# Patient Record
Sex: FEMALE | Race: WHITE | NOT HISPANIC OR LATINO | Employment: FULL TIME | URBAN - METROPOLITAN AREA
[De-identification: names, ages, dates, MRNs, and addresses within clinical notes are randomized per-mention and may not be internally consistent; named-entity substitution may affect disease eponyms.]

---

## 2017-01-17 RX ORDER — LEVOTHYROXINE SODIUM 100 UG/1
TABLET ORAL
Qty: 90 TABLET | Refills: 0 | Status: SHIPPED | OUTPATIENT
Start: 2017-01-17 | End: 2017-04-22 | Stop reason: SDUPTHER

## 2017-01-31 ENCOUNTER — PATIENT MESSAGE (OUTPATIENT)
Dept: INTERNAL MEDICINE | Facility: CLINIC | Age: 37
End: 2017-01-31

## 2017-02-01 ENCOUNTER — OFFICE VISIT (OUTPATIENT)
Dept: INTERNAL MEDICINE | Facility: CLINIC | Age: 37
End: 2017-02-01
Payer: COMMERCIAL

## 2017-02-01 VITALS
SYSTOLIC BLOOD PRESSURE: 114 MMHG | HEART RATE: 70 BPM | HEIGHT: 66 IN | WEIGHT: 176.81 LBS | DIASTOLIC BLOOD PRESSURE: 70 MMHG | TEMPERATURE: 98 F | BODY MASS INDEX: 28.42 KG/M2

## 2017-02-01 DIAGNOSIS — R30.0 DYSURIA: Primary | ICD-10-CM

## 2017-02-01 LAB
BILIRUB SERPL-MCNC: ABNORMAL MG/DL
BILIRUB UR QL STRIP: NEGATIVE
BLOOD URINE, POC: ABNORMAL
CLARITY UR REFRACT.AUTO: CLEAR
COLOR UR AUTO: YELLOW
COLOR, POC UA: YELLOW
GLUCOSE UR QL STRIP: NEGATIVE
GLUCOSE UR QL STRIP: NORMAL
HGB UR QL STRIP: NEGATIVE
KETONES UR QL STRIP: ABNORMAL
KETONES UR QL STRIP: NEGATIVE
LEUKOCYTE ESTERASE UR QL STRIP: NEGATIVE
LEUKOCYTE ESTERASE URINE, POC: ABNORMAL
NITRITE UR QL STRIP: NEGATIVE
NITRITE, POC UA: ABNORMAL
PH UR STRIP: 7 [PH] (ref 5–8)
PH, POC UA: 7
PROT UR QL STRIP: NEGATIVE
PROTEIN, POC: ABNORMAL
SP GR UR STRIP: 1.01 (ref 1–1.03)
SPECIFIC GRAVITY, POC UA: 1015
URN SPEC COLLECT METH UR: NORMAL
UROBILINOGEN UR STRIP-ACNC: NEGATIVE EU/DL
UROBILINOGEN, POC UA: NORMAL

## 2017-02-01 PROCEDURE — 99213 OFFICE O/P EST LOW 20 MIN: CPT | Mod: 25,S$GLB,, | Performed by: PHYSICIAN ASSISTANT

## 2017-02-01 PROCEDURE — 81001 URINALYSIS AUTO W/SCOPE: CPT | Mod: S$GLB,,, | Performed by: PHYSICIAN ASSISTANT

## 2017-02-01 PROCEDURE — 99999 PR PBB SHADOW E&M-EST. PATIENT-LVL V: CPT | Mod: PBBFAC,,, | Performed by: PHYSICIAN ASSISTANT

## 2017-02-01 PROCEDURE — 81003 URINALYSIS AUTO W/O SCOPE: CPT

## 2017-02-01 PROCEDURE — 87086 URINE CULTURE/COLONY COUNT: CPT

## 2017-02-01 PROCEDURE — 87591 N.GONORRHOEAE DNA AMP PROB: CPT

## 2017-02-01 NOTE — PROGRESS NOTES
Subjective:       Patient ID: Bibi Lorenzo is a 36 y.o. female.        Chief Complaint: Urinary Frequency (x1week) and Abdominal Pain (lower, pain=3 x 1week)    HPI Comments: Bibi Lorenzo is an established patient of Morenita Beck MD here today for urgent care visit.    Dysuria, suprapubic pressure x 1 week.  No low back pain.  Increased urinary frequency.  No hematuria.  Urine has been cloudy.  LMP 1/15/17.  No N/V/D/C.  No fever.  Sx started about 1 week ago.  No vaginal discharge.             Review of Systems   Constitutional: Negative for chills, diaphoresis, fatigue and fever.   HENT: Negative for congestion and sore throat.    Eyes: Negative for visual disturbance.   Respiratory: Negative for cough, chest tightness and shortness of breath.    Cardiovascular: Negative for chest pain, palpitations and leg swelling.   Gastrointestinal: Positive for abdominal pain (suprapubic). Negative for blood in stool, constipation, diarrhea, nausea and vomiting.   Genitourinary: Positive for dysuria, frequency and urgency. Negative for hematuria.   Musculoskeletal: Negative for arthralgias and back pain.   Skin: Negative for rash.   Neurological: Negative for dizziness, syncope, weakness and headaches.   Psychiatric/Behavioral: Negative for dysphoric mood and sleep disturbance. The patient is not nervous/anxious.        Objective:      Physical Exam   Constitutional: She appears well-developed and well-nourished.   HENT:   Head: Normocephalic.   Right Ear: External ear normal.   Left Ear: External ear normal.   Mouth/Throat: Oropharynx is clear and moist.   Eyes: Pupils are equal, round, and reactive to light.   Cardiovascular: Normal rate, regular rhythm and normal heart sounds.  Exam reveals no gallop and no friction rub.    No murmur heard.  Pulmonary/Chest: Effort normal and breath sounds normal. No respiratory distress.   Abdominal: Soft. Normal appearance. There is tenderness (mild) in the suprapubic area.  "There is no rigidity, no rebound, no guarding and no CVA tenderness.   Musculoskeletal: She exhibits no edema.   Neurological: She is alert.   Skin: Skin is warm and dry.   Psychiatric: She has a normal mood and affect.   Nursing note and vitals reviewed.      Assessment:       1. Dysuria        Plan:       Bibi was seen today for urinary frequency and abdominal pain.    Diagnoses and all orders for this visit:    Dysuria  -     POCT urinalysis, dipstick or tablet reag  -     Urinalysis  -     Urine culture  -     C. trachomatis/N. gonorrhoeae by AMP DNA Urine  -     Ambulatory consult to Urology    Urine dip negative for WBC's and blood.  Will send to lab for full urinalysis and cx.  Will check for GC/chlamydia though she really has no concern for this as she is monogamous with her , just wants screening to be thorough.  Will consult urology.  She reports every couple of months she seems to get urinary sx and they will often go away with drinking fluids/cranberry juice.    Pt has been given instructions populated from [employer] database and has verbalized understanding of the after visit summary and information contained wherein.    Follow up with a primary care provider. May go to ER for acute shortness of breath, lightheadedness, fever, or any other emergent complaints or changes in condition.    "This note will be shared with the patient"    Future Appointments  Date Time Provider Department Center   2/1/2017 2:30 PM Yandy Bernal PA-C [facility]   2/2/2017 11:15 AM Jong Horn MD [facility]   2/21/2017 2:40 PM Morenita Beck MD [facility]   2/27/2017 10:00 AM Tim Santizo Jr., MD [facility]OLOGY [facility]               "

## 2017-02-01 NOTE — PATIENT INSTRUCTIONS
Dysuria with Uncertain Cause (Adult)    The urethra is the tube that allows urine to pass out of the body. In a woman, the urethra is the opening above the vagina. In men, the urethra is the opening on the tip of the penis. Dysuria is the feeling of pain or burning in the urethra when passing urine.  Dysuria can be caused by anything that irritates or inflames the urethra. This can be caused by an infection or chemical irritation. The most common cause of dysuria in adults is a bladder infection. This is diagnosed with a urine test. It requires treatment with an antibiotic.  Soaps, lotions, colognes, feminine hygiene products, and birth control jellies, creams, and foams can cause chemical irritation and dysuria. It will go away in 1 to 3 days after the last time you use these irritants.  Sexually transmitted disease (STD) from chlamydia or gonorrhea can cause dysuria. If your doctor suspects this, he or she may take a culture specimen. It will take about 3 days to get the results. Antibiotic treatment may be started before the culture test returns.  In women who have gone through menopause, dysuria can be a result of dryness in the lining of the urethra. This can be treated with hormones. Dysuria becomes long-term (chronic) when it lasts for weeks or months. You may need to see a specialist (urologist) to diagnose and treat chronic dysuria.  Home care  The following home care measures may help:  · Avoid any chemicals or products that you suspect may be causing your symptoms.  · If you were given a prescription medicine, take as directed and take the entire amount.  · If a culture was taken, do not have sex until you have been told that it is negative (no infection). Then follow your healthcare provider's advice to treat your condition.  If a culture was done and it is positive:  · Both you and your sexual partner need to be treated, even if your partner has no symptoms.  · Contact your healthcare provider or go  to an urgent care clinic or the public health department to be examined and treated.  · Do not have sex until both you and your partner have completed all antibiotic medicine and are told that you are no longer contagious.  · Learn about safe sex practices and use these in the future. The safest sex is with a partner who has tested negative and only has sex with you. Condoms offer protection from spreading some STDs, including gonorrhea, chlamydia and HIV, but are not a guarantee.  Follow-up care  Follow up with your healthcare provider as advised by our staff. If a culture was taken, call as directed the result. If diagnosed with an STD, follow up with your provider or the public health department for complete STD screening, including HIV testing. For more information, contact the National STD Hotline at 201-005-3442.  When to seek medical advice  Call your healthcare provider right away if any of these occur:  · No improvement after three days of treatment  · Fever of 100.4ºF (38ºC) or higher, or as directed  · Increasing back or abdominal pain  · Inability to urinate because of pain  · A new discharge from the urethra, vagina or penis  · Painful sores on the penis  · Rash or joint pain  · Enlarged painful lymph nodes (lumps) in the groin  · Testicle pain or swelling of the scrotum  © 6913-0552 [employer]. [address] [place] [address], [place] [address]. All rights reserved. This information is not intended as a substitute for professional medical care. Always follow your healthcare professional's instructions.

## 2017-02-01 NOTE — MR AVS SNAPSHOT
[facility] Internal Medicine  [address]  [facility] [address]  Phone: [phone]  Fax: [phone]                  Bibi Lorenzo   2017 2:30 PM   Office Visit    Description:  Female : 1980   Provider:  Yandy Bernal PA-C   Department:  [facility] Internal Medicine           Reason for Visit     Urinary Frequency     Abdominal Pain           Diagnoses this Visit        Comments    Dysuria    -  Primary            To Do List           Future Appointments        Provider Department Dept Phone    2017 2:30 PM Yandy Bernal PA-C [facility] Internal [facility] [phone]    2017 11:15 AM MD Chidi Hillner - [phone]    2017 2:40 PM Morenita Beck MD [facility] Internal Medicine [phone]    2017 10:00 AM Tim Santizo Jr., MD [facility] - Urology 4th Floor [phone]      Goals (5 Years of Data)     None      [facility]     [facility] On Call Nurse Care Line -  Assistance  Registered nurses in the [facility] On Call Center provide clinical advisement, health education, appointment booking, and other advisory services.  Call for this free service at [phone].             Medications           Message regarding Medications     Verify the changes and/or additions to your medication regime listed below are the same as discussed with your clinician today.  If any of these changes or additions are incorrect, please notify your healthcare provider.             Verify that the below list of medications is an accurate representation of the medications you are currently taking.  If none reported, the list may be blank. If incorrect, please contact your healthcare provider. Carry this list with you in case of emergency.           Current Medications     alprazolam (XANAX) 0.25 MG tablet Take 1 tablet (0.25 mg total) by mouth daily as needed for Anxiety.    biotin 300 mcg Tab Take 1 tablet by mouth once daily.    CALCIUM CARBONATE  "(CALCIUM 300 ORAL) Take by mouth.    levothyroxine (SYNTHROID) 100 MCG tablet TAKE 1 TABLET BY MOUTH EVERY DAY    venlafaxine (EFFEXOR) 37.5 MG Tab Take 1 tablet (37.5 mg total) by mouth 2 (two) times daily.           Clinical Reference Information           Vital Signs - Last Recorded  Most recent update: 2/1/2017 10:11 AM by Yandy Bernal PA-C    BP Pulse Temp Ht Wt LMP    114/70 (BP Location: Left arm, Patient Position: Sitting, BP Method: Manual) 70 98 °F (36.7 °C) 5' 6" (1.676 m) 80.2 kg (176 lb 12.9 oz) 01/15/2017    BMI                28.54 kg/m2          Blood Pressure          Most Recent Value    BP  114/70      Allergies as of 2/1/2017     No Known Allergies      Immunizations Administered on Date of Encounter - 2/1/2017     None      Orders Placed During Today's Visit      Normal Orders This Visit    Ambulatory consult to Urology     C. trachomatis/N. gonorrhoeae by AMP DNA Urine     POCT urinalysis, dipstick or tablet reag     Urinalysis     Urine culture       Instructions      Dysuria with Uncertain Cause (Adult)    The urethra is the tube that allows urine to pass out of the body. In a woman, the urethra is the opening above the vagina. In men, the urethra is the opening on the tip of the penis. Dysuria is the feeling of pain or burning in the urethra when passing urine.  Dysuria can be caused by anything that irritates or inflames the urethra. This can be caused by an infection or chemical irritation. The most common cause of dysuria in adults is a bladder infection. This is diagnosed with a urine test. It requires treatment with an antibiotic.  Soaps, lotions, colognes, feminine hygiene products, and birth control jellies, creams, and foams can cause chemical irritation and dysuria. It will go away in 1 to 3 days after the last time you use these irritants.  Sexually transmitted disease (STD) from chlamydia or gonorrhea can cause dysuria. If your doctor suspects this, he or she may take a " culture specimen. It will take about 3 days to get the results. Antibiotic treatment may be started before the culture test returns.  In women who have gone through menopause, dysuria can be a result of dryness in the lining of the urethra. This can be treated with hormones. Dysuria becomes long-term (chronic) when it lasts for weeks or months. You may need to see a specialist (urologist) to diagnose and treat chronic dysuria.  Home care  The following home care measures may help:  · Avoid any chemicals or products that you suspect may be causing your symptoms.  · If you were given a prescription medicine, take as directed and take the entire amount.  · If a culture was taken, do not have sex until you have been told that it is negative (no infection). Then follow your healthcare provider's advice to treat your condition.  If a culture was done and it is positive:  · Both you and your sexual partner need to be treated, even if your partner has no symptoms.  · Contact your healthcare provider or go to an urgent care clinic or the public health department to be examined and treated.  · Do not have sex until both you and your partner have completed all antibiotic medicine and are told that you are no longer contagious.  · Learn about safe sex practices and use these in the future. The safest sex is with a partner who has tested negative and only has sex with you. Condoms offer protection from spreading some STDs, including gonorrhea, chlamydia and HIV, but are not a guarantee.  Follow-up care  Follow up with your healthcare provider as advised by our staff. If a culture was taken, call as directed the result. If diagnosed with an STD, follow up with your provider or the public health department for complete STD screening, including HIV testing. For more information, contact the National STD Hotline at 864-734-9200.  When to seek medical advice  Call your healthcare provider right away if any of these occur:  · No  improvement after three days of treatment  · Fever of 100.4ºF (38ºC) or higher, or as directed  · Increasing back or abdominal pain  · Inability to urinate because of pain  · A new discharge from the urethra, vagina or penis  · Painful sores on the penis  · Rash or joint pain  · Enlarged painful lymph nodes (lumps) in the groin  · Testicle pain or swelling of the scrotum  © 0905-3856 [employer]. [address] [place] [address], [place] [address]. All rights reserved. This information is not intended as a substitute for professional medical care. Always follow your healthcare professional's instructions.

## 2017-02-02 ENCOUNTER — OFFICE VISIT (OUTPATIENT)
Dept: OBSTETRICS AND GYNECOLOGY | Facility: CLINIC | Age: 37
End: 2017-02-02
Payer: COMMERCIAL

## 2017-02-02 VITALS
BODY MASS INDEX: 28.21 KG/M2 | SYSTOLIC BLOOD PRESSURE: 110 MMHG | DIASTOLIC BLOOD PRESSURE: 70 MMHG | HEIGHT: 66 IN | WEIGHT: 175.5 LBS

## 2017-02-02 DIAGNOSIS — N30.90 CYSTITIS: ICD-10-CM

## 2017-02-02 DIAGNOSIS — R10.2 PELVIC PAIN IN FEMALE: Primary | ICD-10-CM

## 2017-02-02 LAB — BACTERIA UR CULT: NORMAL

## 2017-02-02 PROCEDURE — 99213 OFFICE O/P EST LOW 20 MIN: CPT | Mod: S$GLB,,, | Performed by: OBSTETRICS & GYNECOLOGY

## 2017-02-02 PROCEDURE — 99999 PR PBB SHADOW E&M-EST. PATIENT-LVL II: CPT | Mod: PBBFAC,,, | Performed by: OBSTETRICS & GYNECOLOGY

## 2017-02-02 RX ORDER — CIPROFLOXACIN 500 MG/1
500 TABLET ORAL 2 TIMES DAILY
Qty: 10 TABLET | Refills: 1 | Status: SHIPPED | OUTPATIENT
Start: 2017-02-02 | End: 2017-02-07

## 2017-02-02 NOTE — MR AVS SNAPSHOT
[facility]  [address]  [address], [address]  [place] [address]  Phone: [phone]                  Bibi Lorenzo   2017 11:15 AM   Office Visit    Description:  Female : 1980   Provider:  Jong Horn MD   Department:  [facility]           Reason for Visit     Gynecologic Exam                To Do List           Future Appointments        Provider Department Dept Phone    2017 2:40 PM MD Jorge Patrick [facility] - Internal Medicine [phone]    2017 10:00 AM MD Jorge Rowley Jr. [facility] - Urology 4th Floor [phone]      Goals (5 Years of Data)     None      [facility]     [facility] On Call Nurse Care Line -  Assistance  Registered nurses in the [facility] On Call Center provide clinical advisement, health education, appointment booking, and other advisory services.  Call for this free service at [phone].             Medications           Message regarding Medications     Verify the changes and/or additions to your medication regime listed below are the same as discussed with your clinician today.  If any of these changes or additions are incorrect, please notify your healthcare provider.        STOP taking these medications     alprazolam (XANAX) 0.25 MG tablet Take 1 tablet (0.25 mg total) by mouth daily as needed for Anxiety.           Verify that the below list of medications is an accurate representation of the medications you are currently taking.  If none reported, the list may be blank. If incorrect, please contact your healthcare provider. Carry this list with you in case of emergency.           Current Medications     biotin 300 mcg Tab Take 1 tablet by mouth once daily.    CALCIUM CARBONATE (CALCIUM 300 ORAL) Take by mouth.    levothyroxine (SYNTHROID) 100 MCG tablet TAKE 1 TABLET BY MOUTH EVERY DAY    venlafaxine (EFFEXOR) 37.5 MG Tab Take 1 tablet (37.5 mg total) by mouth 2 (two) times daily.           Clinical Reference  "Information           Your Vitals Were     BP Height Weight Last Period BMI    110/70 5' 6" (1.676 m) 79.6 kg (175 lb 7.8 oz) 01/15/2017 28.32 kg/m2      Blood Pressure          Most Recent Value    BP  110/70      Allergies as of 2/2/2017     No Known Allergies      Immunizations Administered on Date of Encounter - 2/2/2017     None      Language Assistance Services     ATTENTION: Language assistance services are available, free of charge. Please call [phone].      ATENCIÓN: Si gabriellala ohmer, tiene a perry disposición servicios gratuitos de asistencia lingüística. Llame al [phone].     ELLIOT Ý: N?u b?n nói Ti?ng Vi?t, có các d?ch v? h? tr? ngôn ng? mi?n phí dành cho b?n. G?i s? [phone].         [facility] complies with applicable Federal civil rights laws and does not discriminate on the basis of race, color, national origin, age, disability, or sex.        "

## 2017-02-03 LAB
C TRACH DNA SPEC QL NAA+PROBE: NEGATIVE
N GONORRHOEA DNA SPEC QL NAA+PROBE: NEGATIVE

## 2017-02-21 ENCOUNTER — OFFICE VISIT (OUTPATIENT)
Dept: INTERNAL MEDICINE | Facility: CLINIC | Age: 37
End: 2017-02-21
Attending: INTERNAL MEDICINE
Payer: COMMERCIAL

## 2017-02-21 ENCOUNTER — HOSPITAL ENCOUNTER (OUTPATIENT)
Dept: RADIOLOGY | Facility: HOSPITAL | Age: 37
Discharge: HOME | End: 2017-02-21
Attending: INTERNAL MEDICINE
Payer: COMMERCIAL

## 2017-02-21 VITALS
SYSTOLIC BLOOD PRESSURE: 123 MMHG | HEART RATE: 80 BPM | DIASTOLIC BLOOD PRESSURE: 79 MMHG | TEMPERATURE: 99 F | HEIGHT: 66 IN | WEIGHT: 175.06 LBS | BODY MASS INDEX: 28.14 KG/M2

## 2017-02-21 DIAGNOSIS — K59.00 CONSTIPATION, UNSPECIFIED CONSTIPATION TYPE: ICD-10-CM

## 2017-02-21 DIAGNOSIS — R10.9 ABDOMINAL PAIN, UNSPECIFIED LOCATION: ICD-10-CM

## 2017-02-21 DIAGNOSIS — E03.4 HYPOTHYROIDISM DUE TO ACQUIRED ATROPHY OF THYROID: ICD-10-CM

## 2017-02-21 DIAGNOSIS — F41.9 ANXIETY: Primary | ICD-10-CM

## 2017-02-21 PROCEDURE — 74000 XR ABDOMEN AP 1 VIEW: CPT | Mod: 26,,, | Performed by: RADIOLOGY

## 2017-02-21 PROCEDURE — 99999 PR PBB SHADOW E&M-EST. PATIENT-LVL III: CPT | Mod: PBBFAC,,, | Performed by: INTERNAL MEDICINE

## 2017-02-21 PROCEDURE — 99214 OFFICE O/P EST MOD 30 MIN: CPT | Mod: S$GLB,,, | Performed by: INTERNAL MEDICINE

## 2017-02-21 PROCEDURE — 74000 XR ABDOMEN AP 1 VIEW: CPT | Mod: TC

## 2017-02-21 PROCEDURE — 1160F RVW MEDS BY RX/DR IN RCRD: CPT | Mod: S$GLB,,, | Performed by: INTERNAL MEDICINE

## 2017-02-21 NOTE — PROGRESS NOTES
"Subjective:       Patient ID: Bibi Lorenzo is a 36 y.o. female.    Chief Complaint: Follow-up    HPI LMP was 2/12/17 - regular. About monthly.  Urinary frequency and abdominal pain.   Seen in UC w/ Yandy Bernal - no UTI as UA neg. Referred to urology. CT/NG neg    Seen in OB/GYN w/ Dr. Horn. Given cipro for UTI. Urinary frequency improved but then she has the urge and then she had to go right away. Some tenderness by the bladder.    Constipation - when she does have BM, it's not a full BM. Sometimes w/ small amount of blood on the stool itself. Reports does have h/o hemorrhoids. BM once every 2-3 days. Tried miralax a few times, which temporarily worked. Tried dulcolax suppository but it didn't completely work. Caused abdominal pain. Tried probiotics and Align, causes increase gas. Tends to get a lot of gas in the lower abdomen.     Weight gain. Hasn't been eating excessively but eating more junk food. Did stop going to the gym. Reports always feels either hot or more cold than everyone else around her.     Anxiety is much improved. On effexor 37.5mg bid.     Review of Systems  as above in HPI.     Objective:      Physical Exam    Visit Vitals    /79    Pulse 80    Temp 98.6 °F (37 °C) (Oral)    Ht 5' 6" (1.676 m)    Wt 79.4 kg (175 lb 0.7 oz)    LMP 02/12/2017    BMI 28.25 kg/m2     gen - A+OX4, NAD  HEENT - PERRL, OP clear. MMM.   Neck - no LAD  CV - RRR, no m/r  Chest - CTAB, no wheezing/rhonchi  Abd - S/NT/ND/+BS  Ext - 2+ BDP and radial pulses. No LE edema.     Assessment/Plan     Bibi was seen today for follow-up.    Diagnoses and all orders for this visit:    Anxiety - given doing ok now and weight gain. Trial of effexor wean.  Take 1 pill daily x 2 weeks. Then take 1/2 pill every day for 2 weeks; then take 1/2 pill every other day x 2 weeks. Then stop.     Hypothyroidism due to acquired atrophy of thyroid - cont synthroid 100mcg daily.  -     Comprehensive metabolic panel; Future  - "     TSH; Future  -     T4, free; Future    Abdominal pain, unspecified location  -     US Abdomen Complete; Future  -     X-Ray Abdomen AP 1 View; Future    Constipation, unspecified constipation type - miralax daily for the next few weeks to see if improvement of constipation, abdominal pain and urinary symptoms.   -     X-Ray Abdomen AP 1 View; Future      Return in about 4 weeks (around 3/21/2017).      Morenita Beck MD  Department of Internal Medicine - [facility]  3:18 PM

## 2017-02-21 NOTE — PATIENT INSTRUCTIONS
Take 1 pill daily x 2 weeks. Then take 1/2 pill every day for 2 weeks; then take 1/2 pill every other day x 2 weeks. Then stop.

## 2017-02-21 NOTE — MR AVS SNAPSHOT
[facility] - Internal Medicine  [address]  [facility] [address]  Phone: [phone]  Fax: [phone]                  Bibi Lorenzo   2017 2:40 PM   Office Visit    Description:  Female : 1980   Provider:  Morenita Beck MD   Department:  [facility]           Reason for Visit     Follow-up           Diagnoses this Visit        Comments    Anxiety    -  Primary     Hypothyroidism due to acquired atrophy of thyroid         Abdominal pain, unspecified location         Constipation, unspecified constipation type                To Do List           Future Appointments        Provider Department Dept Phone    2017 3:45 PM [facility] [id number] [address] LIMIT [facility] [address] [phone]    2017 4:00 PM LAB, APPOINTMENT [facility] INTMED [facility] [address] [phone]    2017 10:00 AM Tim Santizo Jr., MD [facility] - Urology 4th Floor [phone]      Goals (5 Years of Data)     None      Follow-Up and Disposition     Return in about 4 weeks (around 3/21/2017).      [facility]     [facility] Nurse Care Line - 24/7 Assistance  Registered nurses in the [facility] On Call Center provide clinical advisement, health education, appointment booking, and other advisory services.  Call for this free service at [phone].             Medications           Message regarding Medications     Verify the changes and/or additions to your medication regime listed below are the same as discussed with your clinician today.  If any of these changes or additions are incorrect, please notify your healthcare provider.             Verify that the below list of medications is an accurate representation of the medications you are currently taking.  If none reported, the list may be blank. If incorrect, please contact your healthcare provider. Carry this list with you in case of emergency.           Current Medications     biotin 300 mcg Tab Take 1 tablet by  "mouth once daily.    CALCIUM CARBONATE (CALCIUM 300 ORAL) Take by mouth.    levothyroxine (SYNTHROID) 100 MCG tablet TAKE 1 TABLET BY MOUTH EVERY DAY    venlafaxine (EFFEXOR) 37.5 MG Tab Take 1 tablet (37.5 mg total) by mouth 2 (two) times daily.           Clinical Reference Information           Your Vitals Were     BP Pulse Temp Height Weight Last Period    123/79 80 98.6 °F (37 °C) (Oral) 5' 6" (1.676 m) 79.4 kg (175 lb 0.7 oz) 02/12/2017    BMI                28.25 kg/m2          Blood Pressure          Most Recent Value    BP  123/79      Allergies as of 2/21/2017     No Known Allergies      Immunizations Administered on Date of Encounter - 2/21/2017     None      Orders Placed During Today's Visit     Future Labs/Procedures Expected by Expires    Comprehensive metabolic panel  2/21/2017 (Approximate) 4/22/2018    T4, free  2/21/2017 4/22/2018    TSH  2/21/2017 4/22/2018    US Abdomen Complete  2/21/2017 2/21/2018    X-Ray Abdomen AP 1 View  2/21/2017 2/21/2018      Instructions    Take 1 pill daily x 2 weeks. Then take 1/2 pill every day for 2 weeks; then take 1/2 pill every other day x 2 weeks. Then stop.        Language Assistance Services     ATTENTION: Language assistance services are available, free of charge. Please call [phone].      ATENCIÓN: Si habla español, tiene a perry disposición servicios gratuitos de asistencia lingüística. Llame al [phone].     CHÚ Ý: N?u b?n nói Ti?ng Vi?t, có các d?ch v? h? tr? ngôn ng? mi?n phí dành cho b?n. G?i s? [phone].         [facility] complies with applicable Federal civil rights laws and does not discriminate on the basis of race, color, national origin, age, disability, or sex.        "

## 2017-03-02 ENCOUNTER — PATIENT MESSAGE (OUTPATIENT)
Dept: INTERNAL MEDICINE | Facility: CLINIC | Age: 37
End: 2017-03-02

## 2017-03-14 ENCOUNTER — PATIENT MESSAGE (OUTPATIENT)
Dept: INTERNAL MEDICINE | Facility: CLINIC | Age: 37
End: 2017-03-14

## 2017-03-14 DIAGNOSIS — K59.09 CHRONIC CONSTIPATION: Primary | ICD-10-CM

## 2017-03-15 ENCOUNTER — PATIENT MESSAGE (OUTPATIENT)
Dept: INTERNAL MEDICINE | Facility: CLINIC | Age: 37
End: 2017-03-15

## 2017-03-18 ENCOUNTER — PATIENT MESSAGE (OUTPATIENT)
Dept: INTERNAL MEDICINE | Facility: CLINIC | Age: 37
End: 2017-03-18

## 2017-03-19 ENCOUNTER — PATIENT MESSAGE (OUTPATIENT)
Dept: INTERNAL MEDICINE | Facility: CLINIC | Age: 37
End: 2017-03-19

## 2017-04-04 ENCOUNTER — PATIENT MESSAGE (OUTPATIENT)
Dept: INTERNAL MEDICINE | Facility: CLINIC | Age: 37
End: 2017-04-04

## 2017-04-24 RX ORDER — LEVOTHYROXINE SODIUM 100 UG/1
TABLET ORAL
Qty: 90 TABLET | Refills: 3 | Status: SHIPPED | OUTPATIENT
Start: 2017-04-24 | End: 2018-05-13 | Stop reason: SDUPTHER

## 2017-05-11 ENCOUNTER — OFFICE VISIT (OUTPATIENT)
Dept: INTERNAL MEDICINE | Facility: CLINIC | Age: 37
End: 2017-05-11
Payer: COMMERCIAL

## 2017-05-11 ENCOUNTER — PATIENT MESSAGE (OUTPATIENT)
Dept: INTERNAL MEDICINE | Facility: CLINIC | Age: 37
End: 2017-05-11

## 2017-05-11 VITALS
SYSTOLIC BLOOD PRESSURE: 115 MMHG | TEMPERATURE: 99 F | HEIGHT: 66 IN | HEART RATE: 74 BPM | BODY MASS INDEX: 28.95 KG/M2 | WEIGHT: 180.13 LBS | RESPIRATION RATE: 17 BRPM | DIASTOLIC BLOOD PRESSURE: 64 MMHG

## 2017-05-11 DIAGNOSIS — R10.813 RIGHT LOWER QUADRANT ABDOMINAL TENDERNESS WITHOUT REBOUND TENDERNESS: Primary | ICD-10-CM

## 2017-05-11 PROCEDURE — 99213 OFFICE O/P EST LOW 20 MIN: CPT | Mod: S$GLB,,, | Performed by: INTERNAL MEDICINE

## 2017-05-11 PROCEDURE — 1160F RVW MEDS BY RX/DR IN RCRD: CPT | Mod: S$GLB,,, | Performed by: INTERNAL MEDICINE

## 2017-05-11 PROCEDURE — 99999 PR PBB SHADOW E&M-EST. PATIENT-LVL III: CPT | Mod: PBBFAC,,, | Performed by: INTERNAL MEDICINE

## 2017-05-11 NOTE — LETTER
May 11, 2017        Morenita Beck MD  [address]  [facility] [address]             [facility]  [address]  [facility] [address]  Phone: [phone]  Fax: [phone]   Patient: Bibi Lorenzo   MR Number: [id number]   YOB: 1980   Date of Visit: 5/11/2017       Dear Dr. Beck:    Thank you for referring Bibi Lorenzo to me for evaluation. Below are the relevant portions of my assessment and plan of care.          Problem List Items Addressed This Visit     None      Visit Diagnoses     Right lower quadrant abdominal tenderness without rebound tenderness    -  Primary - past hx of consitipation, now with more loose stools. No mary diarrhea.  Stools little darker but no change in odor. Do not feel is c/w with melena. No hx of orthostatic symptoms.   Feel is most c/w with IBS but will check cbc and stool for occult blood.  Also TTG to r/o celiac dx.  If persists or worsens could consider flex sig to r/o IBD but feel yield is low at this time.    Relevant Orders    Occult blood x 1, stool    TISSUE TRANSGLUTAMINASE, IGA    CBC auto differential          Follow Up:   Return if symptoms worsen or fail to improve.      If you have questions, please do not hesitate to call me. I look forward to following Bibi along with you.    Sincerely,      Sumeet Hanley MD           CC  No Recipients

## 2017-05-11 NOTE — PROGRESS NOTES
"Clinic Note  5/11/2017      Subjective:       Patient ID:  Bibi is a 36 y.o. female being seen for an urgent care visit.    Chief Complaint: Abdominal Pain (lower abdomen/ stool black and sticky)    Abdominal Pain   This is a new problem. The current episode started 1 to 4 weeks ago. The problem occurs intermittently. The problem has been unchanged. The pain is located in the RLQ and suprapubic region. The pain is mild. The quality of the pain is colicky and cramping. The abdominal pain does not radiate. Associated symptoms include constipation and diarrhea. She has tried H2 blockers for the symptoms. The treatment provided mild relief.       Review of Systems   Gastrointestinal: Positive for abdominal pain, constipation and diarrhea.       Medication List with Changes/Refills   Current Medications    BIOTIN 300 MCG TAB    Take 1 tablet by mouth once daily.    CALCIUM CARBONATE (CALCIUM 300 ORAL)    Take by mouth.    LEVOTHYROXINE (SYNTHROID) 100 MCG TABLET    TAKE 1 TABLET BY MOUTH DAILY   Discontinued Medications    LINACLOTIDE (LINZESS) 145 MCG CAP CAPSULE    Take 1 capsule (145 mcg total) by mouth once daily. Take at least 30 min prior to the first meal.    VENLAFAXINE (EFFEXOR) 37.5 MG TAB    Take 1 tablet (37.5 mg total) by mouth 2 (two) times daily.       Patient Active Problem List   Diagnosis    Hypothyroidism    Anxiety           Objective:      /64  Pulse 74  Temp 99.3 °F (37.4 °C)  Resp 17  Ht 5' 6" (1.676 m)  Wt 81.7 kg (180 lb 1.9 oz)  BMI 29.07 kg/m2  Estimated body mass index is 29.07 kg/(m^2) as calculated from the following:    Height as of this encounter: 5' 6" (1.676 m).    Weight as of this encounter: 81.7 kg (180 lb 1.9 oz).  Physical Exam   Constitutional: She is oriented to person, place, and time and well-developed, well-nourished, and in no distress.   Cardiovascular: Normal rate, regular rhythm and normal heart sounds.    Pulmonary/Chest: Breath sounds normal.   Abdominal: " Soft. Bowel sounds are normal. There is no hepatosplenomegaly. There is tenderness (very mild) in the right lower quadrant and suprapubic area. There is no rigidity, no rebound, no guarding, no CVA tenderness, no tenderness at McBurney's point and negative Park's sign.       Musculoskeletal: She exhibits no edema.   Neurological: She is alert and oriented to person, place, and time.   Vitals reviewed.        Assessment and Plan:         Problem List Items Addressed This Visit     None      Visit Diagnoses     Right lower quadrant abdominal tenderness without rebound tenderness    -  Primary - past hx of consitipation, now with more loose stools. No mary diarrhea.  Stools little darker but no change in odor. Do not feel is c/w with melena. No hx of orthostatic symptoms.   Feel is most c/w with IBS but will check cbc and stool for occult blood.  Also TTG to r/o celiac dx.  If persists or worsens could consider flex sig to r/o IBD but feel yield is low at this time.    Relevant Orders    Occult blood x 1, stool    TISSUE TRANSGLUTAMINASE, IGA    CBC auto differential          Follow Up:   Return if symptoms worsen or fail to improve.        Sumeet Hanley

## 2017-05-12 ENCOUNTER — LAB VISIT (OUTPATIENT)
Dept: LAB | Facility: HOSPITAL | Age: 37
End: 2017-05-12
Attending: INTERNAL MEDICINE
Payer: COMMERCIAL

## 2017-05-12 DIAGNOSIS — R10.813 RIGHT LOWER QUADRANT ABDOMINAL TENDERNESS WITHOUT REBOUND TENDERNESS: ICD-10-CM

## 2017-05-12 PROCEDURE — 82272 OCCULT BLD FECES 1-3 TESTS: CPT

## 2017-05-13 LAB — OB PNL STL: NEGATIVE

## 2017-10-19 ENCOUNTER — PATIENT MESSAGE (OUTPATIENT)
Dept: INTERNAL MEDICINE | Facility: CLINIC | Age: 37
End: 2017-10-19

## 2017-10-20 ENCOUNTER — OFFICE VISIT (OUTPATIENT)
Dept: INTERNAL MEDICINE | Facility: CLINIC | Age: 37
End: 2017-10-20
Payer: COMMERCIAL

## 2017-10-20 VITALS
HEART RATE: 66 BPM | SYSTOLIC BLOOD PRESSURE: 110 MMHG | DIASTOLIC BLOOD PRESSURE: 74 MMHG | WEIGHT: 176.81 LBS | RESPIRATION RATE: 15 BRPM | HEIGHT: 66 IN | BODY MASS INDEX: 28.42 KG/M2 | TEMPERATURE: 99 F

## 2017-10-20 DIAGNOSIS — R39.15 URINARY URGENCY: Primary | ICD-10-CM

## 2017-10-20 LAB
BACTERIA #/AREA URNS AUTO: NORMAL /HPF
BILIRUB UR QL STRIP: NEGATIVE
CLARITY UR REFRACT.AUTO: CLEAR
COLOR UR AUTO: YELLOW
GLUCOSE UR QL STRIP: NEGATIVE
HGB UR QL STRIP: NEGATIVE
KETONES UR QL STRIP: ABNORMAL
LEUKOCYTE ESTERASE UR QL STRIP: NEGATIVE
MICROSCOPIC COMMENT: NORMAL
NITRITE UR QL STRIP: NEGATIVE
PH UR STRIP: 6 [PH] (ref 5–8)
PROT UR QL STRIP: NEGATIVE
SP GR UR STRIP: 1.01 (ref 1–1.03)
SQUAMOUS #/AREA URNS AUTO: 2 /HPF
URN SPEC COLLECT METH UR: ABNORMAL
UROBILINOGEN UR STRIP-ACNC: NEGATIVE EU/DL
WBC #/AREA URNS AUTO: 1 /HPF (ref 0–5)

## 2017-10-20 PROCEDURE — 99213 OFFICE O/P EST LOW 20 MIN: CPT | Mod: S$GLB,,, | Performed by: INTERNAL MEDICINE

## 2017-10-20 PROCEDURE — 99999 PR PBB SHADOW E&M-EST. PATIENT-LVL IV: CPT | Mod: PBBFAC,,, | Performed by: INTERNAL MEDICINE

## 2017-10-20 PROCEDURE — 81001 URINALYSIS AUTO W/SCOPE: CPT

## 2017-10-20 PROCEDURE — 87086 URINE CULTURE/COLONY COUNT: CPT

## 2017-10-20 RX ORDER — PHENAZOPYRIDINE HYDROCHLORIDE 100 MG/1
100 TABLET, FILM COATED ORAL 3 TIMES DAILY PRN
Qty: 15 TABLET | Refills: 0 | Status: SHIPPED | OUTPATIENT
Start: 2017-10-20 | End: 2017-10-30

## 2017-10-20 RX ORDER — NITROFURANTOIN 25; 75 MG/1; MG/1
100 CAPSULE ORAL 2 TIMES DAILY
Qty: 10 CAPSULE | Refills: 0 | Status: SHIPPED | OUTPATIENT
Start: 2017-10-20 | End: 2017-10-25

## 2017-10-20 NOTE — PROGRESS NOTES
"Subjective:       Patient ID: Bibi Lorenzo is a 37 y.o. female.    Chief Complaint: Urinary Tract Infection (pain with pressure in lower abd, bloody mucus with wiping)    HPI   Few days ago, she started w/ some urinary urgency. Only a little bit comes out sometimes. Sometimes when she wipes, she has bloody mucus (a little bit). Sometimes spots in between menses. LMP was 1 week ago (last day was last week). Some bladder fullness.   Took a home UTI kit was positive for leukocytes. No dysuria. No fevers/chills/back pain. Yesterday had a spot that she's not sure if she's having discharge.     Review of Systems  as above in HPI.    Objective:      Physical Exam    /74   Pulse 66   Temp 98.6 °F (37 °C) (Oral)   Resp 15   Ht 5' 6" (1.676 m)   Wt 80.2 kg (176 lb 12.9 oz)   LMP 10/13/2017   BMI 28.54 kg/m²     GEN - A+OX4, NAD   HEENT - PERRL, EOMI, OP clear. MMM.   Neck - No thyromegaly or cervical LAD. No thyroid masses felt.  CV - RRR, no m/r   Chest - CTAB, no wheezing or rhonchi  Abd - S/NT/ND/+BS.   Ext - 2+BDP and radial pulses. No LE edema.   MSK - no CVA tenderness to palpation.  Skin - No rash.    Assessment/Plan     Bibi was seen today for urinary tract infection.    Diagnoses and all orders for this visit:    Urinary urgency  -     Urinalysis Microscopic  -     Urinalysis  -     Urine culture  -     phenazopyridine (PYRIDIUM) 100 MG tablet; Take 1 tablet (100 mg total) by mouth 3 (three) times daily as needed for Pain.  -     nitrofurantoin, macrocrystal-monohydrate, (MACROBID) 100 MG capsule; Take 1 capsule (100 mg total) by mouth 2 (two) times daily.    If neg UCx and UA, consider urogyn referral.     Return if symptoms worsen or fail to improve.      Morenita Beck MD  Department of Internal Medicine - [facility]  1:09 PM  "

## 2017-10-21 ENCOUNTER — TELEPHONE (OUTPATIENT)
Dept: INTERNAL MEDICINE | Facility: CLINIC | Age: 37
End: 2017-10-21

## 2017-10-21 DIAGNOSIS — R39.15 URINARY URGENCY: Primary | ICD-10-CM

## 2017-10-21 LAB — BACTERIA UR CULT: NORMAL

## 2018-04-12 ENCOUNTER — PATIENT MESSAGE (OUTPATIENT)
Dept: INTERNAL MEDICINE | Facility: CLINIC | Age: 38
End: 2018-04-12

## 2018-05-14 RX ORDER — LEVOTHYROXINE SODIUM 100 UG/1
TABLET ORAL
Qty: 90 TABLET | Refills: 1 | Status: SHIPPED | OUTPATIENT
Start: 2018-05-14 | End: 2018-11-08 | Stop reason: SDUPTHER

## 2018-05-18 ENCOUNTER — TELEPHONE (OUTPATIENT)
Dept: OBSTETRICS AND GYNECOLOGY | Facility: CLINIC | Age: 38
End: 2018-05-18

## 2018-05-18 NOTE — TELEPHONE ENCOUNTER
05/18/2018          820  Contacted pt regarding her possibly coming in early to her appt due to Dr. Viera having to perform a delivery. Pt decided to reschedule her appt to June due to her insurance company changing. Confirmed appt date and time. Pt verbalized understanding.

## 2018-06-07 ENCOUNTER — OFFICE VISIT (OUTPATIENT)
Dept: INTERNAL MEDICINE | Facility: CLINIC | Age: 38
End: 2018-06-07
Payer: COMMERCIAL

## 2018-06-07 VITALS
WEIGHT: 173.06 LBS | SYSTOLIC BLOOD PRESSURE: 100 MMHG | HEART RATE: 75 BPM | TEMPERATURE: 99 F | HEIGHT: 66 IN | BODY MASS INDEX: 27.81 KG/M2 | DIASTOLIC BLOOD PRESSURE: 68 MMHG

## 2018-06-07 DIAGNOSIS — G25.81 RESTLESS LEG: ICD-10-CM

## 2018-06-07 DIAGNOSIS — Z00.00 ANNUAL PHYSICAL EXAM: Primary | ICD-10-CM

## 2018-06-07 DIAGNOSIS — F41.9 ANXIETY: ICD-10-CM

## 2018-06-07 DIAGNOSIS — E03.4 HYPOTHYROIDISM DUE TO ACQUIRED ATROPHY OF THYROID: ICD-10-CM

## 2018-06-07 PROCEDURE — 99395 PREV VISIT EST AGE 18-39: CPT | Mod: S$GLB,,, | Performed by: INTERNAL MEDICINE

## 2018-06-07 PROCEDURE — 99999 PR PBB SHADOW E&M-EST. PATIENT-LVL III: CPT | Mod: PBBFAC,,, | Performed by: INTERNAL MEDICINE

## 2018-06-07 NOTE — PROGRESS NOTES
INTERNAL MEDICINE ANNUAL VISIT NOTE      CHIEF COMPLAINT     ANNUAL    HPI     Bibi Lorenzo is a 37 y.o. C female who presents for her annual exam.    Hypothyroidism - synthroid 100mcg qam.  TFT WNL 17    Anxiety - currently not on meds. Previously on effexor 37.5mg BID. Weaned off since 3/2017.    Chronic constipation. Does not take anything for it. Does not strain/blood in stools.     Trouble sleeping. About to buy a house so some stressors. Goes to bed between 10:30pm to 11pm if she doesn't play w/ her phone.   Tingling feeling in the legs at night (only at night) and this causes her to toss and turn. If not a bad day w/ legs may take a whole hour to fall asleep. Sometimes wakes up and the legs keep her up. When she moves the leg, it improves but then it's short-lived.   Reports has had this during one of her pregnancies.   LMP was 2 weeks ago. Last for 5 days. Sometimes w/ some spotting in between. Reports gotten lighter than it used to be. Sometimes the first 2 days - can be changing a pad or tampon per hour.   No blood in the stool.     Making appt w/ new OB/GYN. Had tubal ligation reversal.     Has been walking dog every day.     Past Medical History:  Past Medical History:   Diagnosis Date    Anxiety     Hypothyroidism        Past Surgical History:  Past Surgical History:   Procedure Laterality Date     SECTION      TUBAL LIGATION         Allergies:  Review of patient's allergies indicates:  No Known Allergies    Home Medications:  Prior to Admission medications    Medication Sig Start Date End Date Taking? Authorizing Provider   biotin 300 mcg Tab Take 1 tablet by mouth once daily.    Historical Provider, MD   CALCIUM CARBONATE (CALCIUM 300 ORAL) Take by mouth.    Historical Provider, MD   levothyroxine (SYNTHROID) 100 MCG tablet TAKE 1 TABLET BY MOUTH DAILY 18   Morenita Beck MD       Family History:  Family History   Problem Relation Age of Onset    Cancer Mother         throat  "   Breast cancer Mother        Social History:  Social History   Substance Use Topics    Smoking status: Former Smoker    Smokeless tobacco: Never Used      Comment: social smoker    Alcohol use Yes      Comment: occasionally       Review of Systems:  Review of Systems   Constitutional: Negative for activity change and unexpected weight change.   HENT: Negative for hearing loss, rhinorrhea and trouble swallowing.    Eyes: Negative for discharge and visual disturbance.   Respiratory: Negative for chest tightness and wheezing.    Cardiovascular: Negative for chest pain and palpitations.   Gastrointestinal: Negative for blood in stool, constipation, diarrhea and vomiting.   Endocrine: Negative for polydipsia and polyuria.   Genitourinary: Positive for menstrual problem. Negative for difficulty urinating, dysuria and hematuria.   Musculoskeletal: Negative for arthralgias, joint swelling and neck pain.   Neurological: Negative for weakness and headaches.   Psychiatric/Behavioral: Negative for confusion and dysphoric mood.       Health Maintainence:   Td 8/18/08  Flu out of season.   Pap 9/11/15    PHYSICAL EXAM     /68 (BP Location: Left arm, Patient Position: Sitting, BP Method: Small (Manual))   Pulse 75   Temp 98.6 °F (37 °C)   Ht 5' 6" (1.676 m)   Wt 78.5 kg (173 lb 1 oz)   BMI 27.93 kg/m²     GEN - A+OX4, NAD   HEENT - PERRL, EOMI, OP clear. MMM.   Neck - No thyromegaly or cervical LAD. No thyroid masses felt.  CV - RRR, no m/r   Chest - CTAB, no wheezing or rhonchi  Abd - S/NT/ND/+BS.   Ext - 2+BDP and radial pulses. No LE edema.   Neuro - PERRL, EOMI, no nystagmus, eyebrow raise, facial sensation, hearing, m of mastication, smile, palatal raise, shoulder shrug, tongue protrusion symmetric and intact. 5/5 BUE and BLE strength. Sensation to light touch intact throughout. 2+ DTRs. Normal gait.   MSK - No spinal tenderness to palpation. Normal gait.   Skin - No rash.    LABS     Previous labs " reviewed.    ASSESSMENT/PLAN     Bibi Lorenzo is a 37 y.o. female with  Bibi was seen today for annual exam.    Diagnoses and all orders for this visit:    Annual physical exam  -     CBC auto differential; Future; Expected date: 06/07/2018  -     Comprehensive metabolic panel; Future; Expected date: 06/07/2018  -     Hemoglobin A1c; Future; Expected date: 06/07/2018  -     Lipid panel; Future; Expected date: 06/07/2018  -     TSH; Future; Expected date: 06/07/2018  -     Ferritin; Future; Expected date: 06/07/2018  -     Iron and TIBC; Future; Expected date: 06/07/2018    Hypothyroidism due to acquired atrophy of thyroid - on synthroid 100mcg daily.   -     TSH; Future; Expected date: 06/07/2018  -     T4, free; Future; Expected date: 06/07/2018    Anxiety - reports doing well.     Restless leg - pt will look up potential side effects of gabapentin and klonopin and let me know.   -     CBC auto differential; Future; Expected date: 06/07/2018  -     Ferritin; Future; Expected date: 06/07/2018  -     Iron and TIBC; Future; Expected date: 06/07/2018    RTC in 12 months, sooner if needed and depending on labs.    Morenita Beck MD  Department of Internal Medicine - [facility]  8:19 AM

## 2018-06-13 ENCOUNTER — LAB VISIT (OUTPATIENT)
Dept: LAB | Facility: HOSPITAL | Age: 38
End: 2018-06-13
Attending: INTERNAL MEDICINE
Payer: COMMERCIAL

## 2018-06-13 DIAGNOSIS — E03.4 HYPOTHYROIDISM DUE TO ACQUIRED ATROPHY OF THYROID: ICD-10-CM

## 2018-06-13 DIAGNOSIS — G25.81 RESTLESS LEG: ICD-10-CM

## 2018-06-13 DIAGNOSIS — Z00.00 ANNUAL PHYSICAL EXAM: ICD-10-CM

## 2018-06-13 DIAGNOSIS — E61.1 IRON DEFICIENCY: ICD-10-CM

## 2018-06-13 LAB
ALBUMIN SERPL BCP-MCNC: 3.8 G/DL
ALP SERPL-CCNC: 48 U/L
ALT SERPL W/O P-5'-P-CCNC: 9 U/L
ANION GAP SERPL CALC-SCNC: 5 MMOL/L
AST SERPL-CCNC: 13 U/L
BASOPHILS # BLD AUTO: 0.02 K/UL
BASOPHILS NFR BLD: 0.4 %
BILIRUB SERPL-MCNC: 0.6 MG/DL
BUN SERPL-MCNC: 11 MG/DL
CALCIUM SERPL-MCNC: 9.1 MG/DL
CHLORIDE SERPL-SCNC: 111 MMOL/L
CHOLEST SERPL-MCNC: 149 MG/DL
CHOLEST/HDLC SERPL: 3 {RATIO}
CO2 SERPL-SCNC: 24 MMOL/L
CREAT SERPL-MCNC: 0.7 MG/DL
DIFFERENTIAL METHOD: ABNORMAL
EOSINOPHIL # BLD AUTO: 0.1 K/UL
EOSINOPHIL NFR BLD: 2.6 %
ERYTHROCYTE [DISTWIDTH] IN BLOOD BY AUTOMATED COUNT: 15.1 %
EST. GFR  (AFRICAN AMERICAN): >60 ML/MIN/1.73 M^2
EST. GFR  (NON AFRICAN AMERICAN): >60 ML/MIN/1.73 M^2
ESTIMATED AVG GLUCOSE: 88 MG/DL
FERRITIN SERPL-MCNC: 7 NG/ML
GLUCOSE SERPL-MCNC: 90 MG/DL
HBA1C MFR BLD HPLC: 4.7 %
HCT VFR BLD AUTO: 40.5 %
HDLC SERPL-MCNC: 49 MG/DL
HDLC SERPL: 32.9 %
HGB BLD-MCNC: 13.4 G/DL
IRON SERPL-MCNC: 81 UG/DL
LDLC SERPL CALC-MCNC: 94.2 MG/DL
LYMPHOCYTES # BLD AUTO: 1.5 K/UL
LYMPHOCYTES NFR BLD: 30.7 %
MCH RBC QN AUTO: 29.2 PG
MCHC RBC AUTO-ENTMCNC: 33.1 G/DL
MCV RBC AUTO: 88 FL
MONOCYTES # BLD AUTO: 0.4 K/UL
MONOCYTES NFR BLD: 8.8 %
NEUTROPHILS # BLD AUTO: 2.9 K/UL
NEUTROPHILS NFR BLD: 57.5 %
NONHDLC SERPL-MCNC: 100 MG/DL
PLATELET # BLD AUTO: 245 K/UL
PMV BLD AUTO: 11.1 FL
POTASSIUM SERPL-SCNC: 4.6 MMOL/L
PROT SERPL-MCNC: 6.5 G/DL
RBC # BLD AUTO: 4.59 M/UL
SATURATED IRON: 23 %
SODIUM SERPL-SCNC: 140 MMOL/L
T4 FREE SERPL-MCNC: 1.36 NG/DL
TOTAL IRON BINDING CAPACITY: 346 UG/DL
TRANSFERRIN SERPL-MCNC: 234 MG/DL
TRIGL SERPL-MCNC: 29 MG/DL
TSH SERPL DL<=0.005 MIU/L-ACNC: 0.91 UIU/ML
WBC # BLD AUTO: 4.99 K/UL

## 2018-06-13 PROCEDURE — 85025 COMPLETE CBC W/AUTO DIFF WBC: CPT

## 2018-06-13 PROCEDURE — 84443 ASSAY THYROID STIM HORMONE: CPT

## 2018-06-13 PROCEDURE — 80061 LIPID PANEL: CPT

## 2018-06-13 PROCEDURE — 82728 ASSAY OF FERRITIN: CPT

## 2018-06-13 PROCEDURE — 36415 COLL VENOUS BLD VENIPUNCTURE: CPT

## 2018-06-13 PROCEDURE — 83036 HEMOGLOBIN GLYCOSYLATED A1C: CPT

## 2018-06-13 PROCEDURE — 83540 ASSAY OF IRON: CPT

## 2018-06-13 PROCEDURE — 84439 ASSAY OF FREE THYROXINE: CPT

## 2018-06-13 PROCEDURE — 80053 COMPREHEN METABOLIC PANEL: CPT

## 2018-06-21 ENCOUNTER — OFFICE VISIT (OUTPATIENT)
Dept: OBSTETRICS AND GYNECOLOGY | Facility: CLINIC | Age: 38
End: 2018-06-21
Payer: COMMERCIAL

## 2018-06-21 VITALS
WEIGHT: 169 LBS | BODY MASS INDEX: 27.16 KG/M2 | SYSTOLIC BLOOD PRESSURE: 98 MMHG | HEIGHT: 66 IN | DIASTOLIC BLOOD PRESSURE: 66 MMHG

## 2018-06-21 DIAGNOSIS — Z01.419 ENCOUNTER FOR ANNUAL ROUTINE GYNECOLOGICAL EXAMINATION: Primary | ICD-10-CM

## 2018-06-21 DIAGNOSIS — Z11.3 SCREENING EXAMINATION FOR STD (SEXUALLY TRANSMITTED DISEASE): ICD-10-CM

## 2018-06-21 DIAGNOSIS — Z12.4 PAP SMEAR FOR CERVICAL CANCER SCREENING: ICD-10-CM

## 2018-06-21 DIAGNOSIS — Z98.890 HISTORY OF REVERSAL OF TUBAL LIGATION: ICD-10-CM

## 2018-06-21 PROCEDURE — 87624 HPV HI-RISK TYP POOLED RSLT: CPT

## 2018-06-21 PROCEDURE — 99395 PREV VISIT EST AGE 18-39: CPT | Mod: S$GLB,,, | Performed by: OBSTETRICS & GYNECOLOGY

## 2018-06-21 PROCEDURE — 99999 PR PBB SHADOW E&M-EST. PATIENT-LVL III: CPT | Mod: PBBFAC,,, | Performed by: OBSTETRICS & GYNECOLOGY

## 2018-06-21 PROCEDURE — 88175 CYTOPATH C/V AUTO FLUID REDO: CPT

## 2018-06-21 RX ORDER — MULTIVITAMIN
1 TABLET ORAL DAILY
Status: ON HOLD | COMMUNITY
End: 2020-05-19 | Stop reason: HOSPADM

## 2018-06-21 NOTE — PROGRESS NOTES
Chief Complaint   Patient presents with    Well Woman       HISTORY OF PRESENT ILLNESS:   Bibi Lorenzo is a 38 y.o. female  who presents for well woman exam.  Patient's last menstrual period was 2018..  She has no complaints.  Cycles are regular. Declines STD testing. She had a BTL then had it reversed with Dr. Simons 2017. She has been trying to conceive since  with no success. She does OPK that shows she is ovulating. She never had difficulty getting pregnant in the past.      Past Medical History:   Diagnosis Date    Anxiety     Hypothyroidism           Past Surgical History:   Procedure Laterality Date     SECTION      TUBAL LIGATION      tubal ligation reversal           Social History     Social History    Marital status:      Spouse name: N/A    Number of children: N/A    Years of education: N/A     Occupational History    not working      Social History Main Topics    Smoking status: Former Smoker    Smokeless tobacco: Never Used      Comment: social smoker    Alcohol use Yes      Comment: occasionally    Drug use: No    Sexual activity: Yes     Partners: Male      Comment: tubal ligation     Other Topics Concern    Not on file     Social History Narrative    No narrative on file       Family History   Problem Relation Age of Onset    Cancer Mother         throat    Breast cancer Mother          OB History    Para Term  AB Living   3 3 3     4   SAB TAB Ectopic Multiple Live Births         1 4      # Outcome Date GA Lbr Shadi/2nd Weight Sex Delivery Anes PTL Lv   3 Term 07    M CS-LTranv   JI   2A Term 06    M CS-LTranv   JI   2B Term 06    M CS-LTranv   JI   1 Term 02    F Vag-Spont   JI            COMPREHENSIVE GYN HISTORY:  PAP History: Denies abnormal Paps, 2015 NILM   Infection History: Denies STDs. Denies PID.  Benign History:Denies uterine fibroids. Denies ovarian cysts. Denies endometriosis Denies other  "conditions.  Cancer History: Denies cervical cancer. Denies uterine cancer or hyperplasia. Denies ovarian cancer. Denies vulvar cancer or pre-cancer. Denies vaginal cancer or pre-cancer. Denies breast cancer. Denies colon cancer.  Cycle: 15/28d/5-6 days   Contraception: none     ROS:  GENERAL: Denies weight gain or weight loss. Feeling well overall.   SKIN: Denies rash or lesions.   HEAD: Denies headache.   NODES: Denies enlarged lymph nodes.   CHEST: Denies shortness of breath.   ABDOMEN: No abdominal pain, constipation, diarrhea, nausea, vomiting or rectal bleeding.   URINARY: No frequency, dysuria, hematuria, or burning on urination.  REPRODUCTIVE: See HPI.   BREASTS: The patient denies pain, lumps, or nipple discharge.       BP 98/66   Ht 5' 6" (1.676 m)   Wt 76.7 kg (169 lb)   LMP 05/22/2018   BMI 27.28 kg/m²     APPEARANCE: Well nourished, well developed, in no acute distress.  NECK: Neck symmetric without  thyromegaly.  NODES: No inguinal, cervical lymph node enlargement.  CHEST: Lungs clear to auscultation.  HEART: Regular rate and rhythm, no murmurs, rubs or gallops.  ABDOMEN: Soft. No tenderness or masses. No hernias. No hepatosplenomegaly. Well healed pfannenstiel incision   BREASTS: Symmetrical, no skin changes or visible lesions. No palpable masses, nipple discharge or adenopathy bilaterally.  PELVIC:   VULVA: No lesions. Normal female genitalia.  URETHRAL MEATUS: Normal size and location, no lesions, no prolapse.  URETHRA: No masses, tenderness, prolapse or scarring.  VAGINA: Moist and well rugated, no discharge, no significant cystocele or rectocele.  CERVIX: No lesions and discharge.  UTERUS: Normal size, regular shape, mobile, non-tender, bladder base nontender.  ADNEXA: No masses or tenderness.    Data Reviewed:   Last Pap: Date: 2015 NILM      1. Encounter for annual routine gynecological examination    2. Pap smear for cervical cancer screening    3. Screening examination for STD (sexually " transmitted disease)    4. History of reversal of tubal ligation        Plan:  Routine gyn s/p normal breast exam. Pap With HPV cotesting ordered . STD testing: GC/CT/trich, syphilis, HBV/HCV and HIV declined.   2. Discussed that she is AMA and the risk of genetic problems. She is taking a PNV We discussed that even though the tubes seemed open when he did the reversal that they could be blocked after they healed. I would recommend HSG, semen analysis and hormone work up. We discussed it is reasonable for her to go see ELINOR now.  She wants to do  basic hormone work up on day 3. We discussed that insurance typically doesn't pay for any of this testing.   3. TSH is in the low normal range which is what we would want.     F/u in 1 yr or PRN

## 2018-06-26 LAB
HPV HR 12 DNA CVX QL NAA+PROBE: NEGATIVE
HPV16 AG SPEC QL: NEGATIVE
HPV18 DNA SPEC QL NAA+PROBE: NEGATIVE

## 2018-06-27 ENCOUNTER — PATIENT MESSAGE (OUTPATIENT)
Dept: OBSTETRICS AND GYNECOLOGY | Facility: HOSPITAL | Age: 38
End: 2018-06-27

## 2018-06-27 ENCOUNTER — LAB VISIT (OUTPATIENT)
Dept: LAB | Facility: HOSPITAL | Age: 38
End: 2018-06-27
Attending: OBSTETRICS & GYNECOLOGY
Payer: COMMERCIAL

## 2018-06-27 DIAGNOSIS — Z98.890 HISTORY OF REVERSAL OF TUBAL LIGATION: ICD-10-CM

## 2018-06-27 LAB
ESTRADIOL SERPL-MCNC: 43 PG/ML
FSH SERPL-ACNC: 9.4 MIU/ML
LH SERPL-ACNC: 7.7 MIU/ML
PROGEST SERPL-MCNC: 0.3 NG/ML
PROLACTIN SERPL IA-MCNC: 12.2 NG/ML

## 2018-06-27 PROCEDURE — 83520 IMMUNOASSAY QUANT NOS NONAB: CPT

## 2018-06-27 PROCEDURE — 84402 ASSAY OF FREE TESTOSTERONE: CPT

## 2018-06-27 PROCEDURE — 82670 ASSAY OF TOTAL ESTRADIOL: CPT

## 2018-06-27 PROCEDURE — 83001 ASSAY OF GONADOTROPIN (FSH): CPT

## 2018-06-27 PROCEDURE — 84144 ASSAY OF PROGESTERONE: CPT

## 2018-06-27 PROCEDURE — 84146 ASSAY OF PROLACTIN: CPT

## 2018-06-27 PROCEDURE — 83002 ASSAY OF GONADOTROPIN (LH): CPT

## 2018-06-29 ENCOUNTER — PATIENT MESSAGE (OUTPATIENT)
Dept: OBSTETRICS AND GYNECOLOGY | Facility: CLINIC | Age: 38
End: 2018-06-29

## 2018-06-29 LAB
MIS SERPL-MCNC: 0.3 NG/ML (ref 0.9–9.5)
TESTOST FREE SERPL-MCNC: 1 PG/ML

## 2018-07-03 ENCOUNTER — TELEPHONE (OUTPATIENT)
Dept: OBSTETRICS AND GYNECOLOGY | Facility: CLINIC | Age: 38
End: 2018-07-03

## 2018-07-03 NOTE — TELEPHONE ENCOUNTER
Called patient to discuss lab work and AMH of < 0.3 She understands she would be better served with a fertility specialist. She has Dr. Jack information and is going to call them.

## 2018-07-20 ENCOUNTER — TELEPHONE (OUTPATIENT)
Dept: INTERNAL MEDICINE | Facility: CLINIC | Age: 38
End: 2018-07-20

## 2018-07-20 DIAGNOSIS — B85.0 HEAD LICE: Primary | ICD-10-CM

## 2018-07-20 RX ORDER — MALATHION 0 G/ML
LOTION TOPICAL
Qty: 59 ML | Refills: 0 | Status: SHIPPED | OUTPATIENT
Start: 2018-07-20 | End: 2018-07-20 | Stop reason: SDUPTHER

## 2018-07-20 RX ORDER — MALATHION 0 G/ML
LOTION TOPICAL
Qty: 59 ML | Refills: 0 | Status: SHIPPED | OUTPATIENT
Start: 2018-07-20 | End: 2018-10-03

## 2018-07-20 NOTE — TELEPHONE ENCOUNTER
I sent malthion lotion. Not sure what she has already tried over the counter. Will need list w active ingredients if she has done an OTC version of this.

## 2018-07-20 NOTE — TELEPHONE ENCOUNTER
Pt called stating that she caught hair lice from her children. She says she has been having it for a week and has tried several different OTC shampoo and spray. She states that her hair is very thick so nothing seems to be working. pt has had this problem once before, she says the only thing that worked was a prescribed medication. Pt requesting a rx be sent to [address] [address]    Please advise, thanks

## 2018-07-20 NOTE — TELEPHONE ENCOUNTER
----- Message from Misty Clay sent at 7/20/2018  7:07 AM CDT -----  Contact: Patient [phone]      ----- Message -----  From: Misty Clay  Sent: 7/20/2018   7:06 AM  To: Ebony Xavier Staff    Patient is requesting a Rx for a situation but stated that it is embarrassing and wanted to speak with you directly.    Please call and advise.    Thank You

## 2018-07-23 ENCOUNTER — TELEPHONE (OUTPATIENT)
Dept: INTERNAL MEDICINE | Facility: CLINIC | Age: 38
End: 2018-07-23

## 2018-07-23 NOTE — TELEPHONE ENCOUNTER
Can you give me a list of what she has tried over the counter. Has her children's lice cleared up? What did they use? Did they need a rx from the pediatrician to clear?

## 2018-07-23 NOTE — TELEPHONE ENCOUNTER
----- Message from Misty Clay sent at 7/20/2018  3:00 PM CDT -----  Contact: [facility] [phone]  Prescription Alternative Needed:     The pharmacy needs alternative on the following RX:    malathion (OVIDE) 0.5 % lotion    Reason: Drug not covered. Please send less expensive alternative.    Pharmacy: [id number] [place] [address]    Please advise.    Thank You

## 2018-07-23 NOTE — TELEPHONE ENCOUNTER
Spoke with [facility]. Pharmacy stated that the Ovide 0.5% lotion was not covered under pt's insurance.  Pharmacy would like to know can you send in a less expensive alternative for pt?     Please adv, thanks.

## 2018-07-24 NOTE — TELEPHONE ENCOUNTER
Spoke with pt. Pt refused to give list of what medications she uses over the counter. Pt stated that she will handle issue on her own. verbalized understanding. Pt stated that pharmacy did call to inform that lotion was not covered under her insurance.

## 2018-09-11 ENCOUNTER — TELEPHONE (OUTPATIENT)
Dept: OBSTETRICS AND GYNECOLOGY | Facility: CLINIC | Age: 38
End: 2018-09-11

## 2018-09-11 ENCOUNTER — LAB VISIT (OUTPATIENT)
Dept: LAB | Facility: HOSPITAL | Age: 38
End: 2018-09-11
Attending: OBSTETRICS & GYNECOLOGY
Payer: COMMERCIAL

## 2018-09-11 DIAGNOSIS — Z32.01 POSITIVE PREGNANCY TEST: ICD-10-CM

## 2018-09-11 LAB — HCG INTACT+B SERPL-ACNC: 29 MIU/ML

## 2018-09-11 PROCEDURE — 36415 COLL VENOUS BLD VENIPUNCTURE: CPT | Mod: PO

## 2018-09-11 PROCEDURE — 84702 CHORIONIC GONADOTROPIN TEST: CPT

## 2018-09-11 NOTE — TELEPHONE ENCOUNTER
MA spoke with patient who is 3 wga and denies complaints. Will place HCG orders for Q48 hours. Patient instructed to called is she has any pain or bleeding or problems. If HCGs are increasing appropriately will schedule US and visit around 6-7 wga but if not then will see sooner.

## 2018-09-11 NOTE — TELEPHONE ENCOUNTER
----- Message from Joyce Fried MA sent at 9/11/2018  9:31 AM CDT -----  Contact: [phone]/ SELF   Hi Dr. Viera,    Pt needs hcg orders for today and Thursday.    Thanks,  Joyce  ----- Message -----  From: Thais Irene  Sent: 9/11/2018   8:26 AM  To: Aylin Leach Staff    Pt its requesting an appointment for this week or beginning of next week . Pt states she got a positive pregnancy test but she recently had a tube ligation removal and she wants to make sure that she doesn't have ectopic pregnancy. Please advise

## 2018-09-12 ENCOUNTER — PATIENT MESSAGE (OUTPATIENT)
Dept: OBSTETRICS AND GYNECOLOGY | Facility: CLINIC | Age: 38
End: 2018-09-12

## 2018-09-12 ENCOUNTER — TELEPHONE (OUTPATIENT)
Dept: OBSTETRICS AND GYNECOLOGY | Facility: CLINIC | Age: 38
End: 2018-09-12

## 2018-09-12 DIAGNOSIS — Z32.01 POSITIVE PREGNANCY TEST: Primary | ICD-10-CM

## 2018-09-12 NOTE — TELEPHONE ENCOUNTER
Called patient to discuss low pregnancy hormone level of 29 her lmp was 8/18/18 so she is 3 4/7 wga. We discussed it is very low but it is likely because she is very early pregnant. No bleeding. Having some very subtle sharp pains but no cramping and it comes and goes quickly. Will check another one tomorrow. Discussed ectopic and SAB precautions.

## 2018-09-13 ENCOUNTER — LAB VISIT (OUTPATIENT)
Dept: LAB | Facility: HOSPITAL | Age: 38
End: 2018-09-13
Attending: OBSTETRICS & GYNECOLOGY
Payer: COMMERCIAL

## 2018-09-13 DIAGNOSIS — Z32.01 POSITIVE PREGNANCY TEST: ICD-10-CM

## 2018-09-13 LAB — HCG INTACT+B SERPL-ACNC: 74 MIU/ML

## 2018-09-13 PROCEDURE — 36415 COLL VENOUS BLD VENIPUNCTURE: CPT | Mod: PO

## 2018-09-13 PROCEDURE — 84702 CHORIONIC GONADOTROPIN TEST: CPT

## 2018-09-14 NOTE — TELEPHONE ENCOUNTER
Discussed with patient that hormone level increased as we would expect but is still very low. She denies complaints. We discussed bleeding and ectopic precautions. Please call her and schedule a visit and ultrasound in 2-3 weeks. Thanks

## 2018-09-21 PROBLEM — O36.80X0 PREGNANCY, LOCATION UNKNOWN: Status: ACTIVE | Noted: 2018-09-21

## 2018-09-22 PROBLEM — O00.90 ECTOPIC PREGNANCY: Status: ACTIVE | Noted: 2018-09-22

## 2018-09-24 ENCOUNTER — TELEPHONE (OUTPATIENT)
Dept: OBSTETRICS AND GYNECOLOGY | Facility: CLINIC | Age: 38
End: 2018-09-24

## 2018-09-24 NOTE — TELEPHONE ENCOUNTER
----- Message from Ashtyn Reid sent at 9/24/2018 11:25 AM CDT -----  Contact: [phone]/self  Patient requesting to speak with you regarding her medical condition. Please advise.

## 2018-09-26 ENCOUNTER — PATIENT MESSAGE (OUTPATIENT)
Dept: OBSTETRICS AND GYNECOLOGY | Facility: CLINIC | Age: 38
End: 2018-09-26

## 2018-09-27 ENCOUNTER — OFFICE VISIT (OUTPATIENT)
Dept: OBSTETRICS AND GYNECOLOGY | Facility: CLINIC | Age: 38
End: 2018-09-27
Payer: COMMERCIAL

## 2018-09-27 ENCOUNTER — PROCEDURE VISIT (OUTPATIENT)
Dept: OBSTETRICS AND GYNECOLOGY | Facility: CLINIC | Age: 38
End: 2018-09-27
Payer: COMMERCIAL

## 2018-09-27 VITALS
HEIGHT: 66 IN | DIASTOLIC BLOOD PRESSURE: 76 MMHG | SYSTOLIC BLOOD PRESSURE: 108 MMHG | BODY MASS INDEX: 26.58 KG/M2 | WEIGHT: 165.38 LBS

## 2018-09-27 DIAGNOSIS — O00.91 ECTOPIC PREGNANCY WITH INTRAUTERINE PREGNANCY, UNSPECIFIED LOCATION: Primary | ICD-10-CM

## 2018-09-27 DIAGNOSIS — Z32.01 POSITIVE PREGNANCY TEST: ICD-10-CM

## 2018-09-27 DIAGNOSIS — O00.102 LEFT TUBAL PREGNANCY WITHOUT INTRAUTERINE PREGNANCY: Primary | ICD-10-CM

## 2018-09-27 PROCEDURE — 99213 OFFICE O/P EST LOW 20 MIN: CPT | Mod: 25,S$GLB,, | Performed by: OBSTETRICS & GYNECOLOGY

## 2018-09-27 PROCEDURE — 99999 PR PBB SHADOW E&M-EST. PATIENT-LVL III: CPT | Mod: PBBFAC,,, | Performed by: OBSTETRICS & GYNECOLOGY

## 2018-09-27 PROCEDURE — 76817 TRANSVAGINAL US OBSTETRIC: CPT | Mod: S$GLB,,, | Performed by: OBSTETRICS & GYNECOLOGY

## 2018-09-27 NOTE — PROGRESS NOTES
Chief Complaint   Patient presents with    Ectopic Pregnancy       HPI:   Bibi Molina Cargo 38 y.o.  S/p tubal reversal who was diagnosed with an ectopic pregnancy and treated with MTX on 18 is here for follow up. She reports she was having slight cramping pain in LLQ on that day but none now. She reports every now and again if she turns a certain way she will get pain.      No LMP recorded.     Past Medical History:   Diagnosis Date    Anxiety     Hypothyroidism        Past Surgical History:   Procedure Laterality Date     SECTION      TUBAL LIGATION      tubal ligation reversal  2017       Family History   Problem Relation Age of Onset    Cancer Mother         throat    Breast cancer Mother        Social History     Socioeconomic History    Marital status:      Spouse name: Not on file    Number of children: Not on file    Years of education: Not on file    Highest education level: Not on file   Social Needs    Financial resource strain: Not on file    Food insecurity - worry: Not on file    Food insecurity - inability: Not on file    Transportation needs - medical: Not on file    Transportation needs - non-medical: Not on file   Occupational History    Occupation: not working   Tobacco Use    Smoking status: Former Smoker    Smokeless tobacco: Never Used    Tobacco comment: social smoker   Substance and Sexual Activity    Alcohol use: Yes     Comment: occasionally    Drug use: No    Sexual activity: Yes     Partners: Male     Comment: tubal ligation   Other Topics Concern    Not on file   Social History Narrative    Not on file       OB History      Para Term  AB Living    4 3 3     4    SAB TAB Ectopic Multiple Live Births          1 4               ROS:  GENERAL: Denies weight gain or weight loss. Feeling well overall.   SKIN: Denies rash or lesions.   HEAD: Denies headache.   CHEST: Denies chest pain   RESPIRATORY: Denies shortness of  "breath  ABDOMEN: No abdominal pain, constipation, diarrhea, nausea, vomiting or rectal bleeding.   URINARY: No frequency, dysuria, hematuria, or burning on urination.  REPRODUCTIVE: See HPI.   All other ROS negative     PE:   /76   Ht 5' 6" (1.676 m)   Wt 75 kg (165 lb 5.5 oz)   BMI 26.69 kg/m²     APPEARANCE: Well nourished, well developed, in no acute distress.  NEUROLOGIC: orientated to person, place and time, normal mood and affect   NECK: no masses, tracheal position normal, thyroid not enlarged, no masses   SKIN: no rashes or lesions  RESPIRATORY: normal respiratory effort, no use of accessory muscles   CARDIOVASCULAR: RRR, no murmurs, extremities normal with no edema    ABDOMEN: Soft. No tenderness or masses. No hernias. No hepatosplenomegaly noted.   PELVIC:   EXTERNAL GENITALIA/VULVA: No lesions. Normal female genitalia.  URETHRAL MEATUS: Normal size and location, no lesions, no prolapse.  URETHRA: No masses, tenderness, prolapse or scarring.  BLADDER: non-tender, no masses  VAGINA: Moist and well rugated, no discharge, no significant cystocele or rectocele.  CERVIX: No lesions and discharge.  UTERUS: normal size, regular shape, mobile, non-tender, bladder base nontender.  ADNEXA: No masses or tenderness.  PERINEUM: normal in appearance, no external hemorrhoids     9/22: 4109-> 6971 9/26 9/22/18 TVUS: The uterus measures 9.4 x 4.8 x 6.7 cm.  The endometrial stripe is thickened and echogenic measuring 1.9 cm.  No intrauterine gestational sac is identified.  Small nonspecific calcification in the uterine fundus is noted.  The right ovary measures 3.3 x 1.9 x 2.3 cm and demonstrates a 2.2 x 2.0 x 1.9 cm ovoid hypoechoic focus with internal echoes and thickened appearing walls, possibly reflective of a corpus luteum.  There is an ovoid hypoechoic structure in left adnexa with suspected follicle suggestive of a left ovary, measuring 2.2 x 1.2 x 1.3 cm.  Adjacent to the presumed left ovary there is a " complex mixed echogenicity mass measuring approximately 4.9 x 2.7 x 2.9 cm with a thick walled cystic appearing component.  This is highly concerning for possible ectopic pregnancy.  Small amount of simple appearing fluid within the pelvis is noted.  Incidentally, a left pelvic kidney is identified.  The left pelvic kidney measures 7.0 x 4.4 x 4.5 cm and demonstrates no evidence of hydronephrosis.    9/27/18: normal uterus, right ovary with complex 1.5 cm cyst and left ovary normal with ectopic pregnancy with YS in L adnexa measuring 3.5x3x3    1. Left tubal pregnancy without intrauterine pregnancy        Plan:  1. Discussed the way MTX works. We discussed that it is ok that her HCG went up from day 1 to 4 but we want it to go down from day 4 to day 7 by 15% if it doesn't and she isn't having issues I would recommend another dose of MTX and to follow the HCGs again. We discussed if it still isn't resolving after that then I would recommend surgery. We discussed that some cramping and bleeding will be normal but any pain more than that she should come to the ER or call for evaluation. Order for 7 day HCG is in and appt given for in [place] but she understands she may need to come here if she needs a second dose of MTX. She understands should use birth control for 3 months. She would like to do HSG after this resolves.

## 2018-09-29 ENCOUNTER — LAB VISIT (OUTPATIENT)
Dept: LAB | Facility: HOSPITAL | Age: 38
End: 2018-09-29
Attending: OBSTETRICS & GYNECOLOGY
Payer: COMMERCIAL

## 2018-09-29 DIAGNOSIS — O00.102 LEFT TUBAL PREGNANCY WITHOUT INTRAUTERINE PREGNANCY: ICD-10-CM

## 2018-09-29 LAB — HCG INTACT+B SERPL-ACNC: 5027 MIU/ML

## 2018-09-29 PROCEDURE — 36415 COLL VENOUS BLD VENIPUNCTURE: CPT | Mod: PO

## 2018-09-29 PROCEDURE — 84702 CHORIONIC GONADOTROPIN TEST: CPT

## 2018-10-03 ENCOUNTER — OFFICE VISIT (OUTPATIENT)
Dept: OBSTETRICS AND GYNECOLOGY | Facility: CLINIC | Age: 38
End: 2018-10-03
Payer: COMMERCIAL

## 2018-10-03 VITALS
BODY MASS INDEX: 26.58 KG/M2 | DIASTOLIC BLOOD PRESSURE: 62 MMHG | WEIGHT: 165.38 LBS | HEIGHT: 66 IN | SYSTOLIC BLOOD PRESSURE: 98 MMHG

## 2018-10-03 DIAGNOSIS — O00.102 LEFT TUBAL PREGNANCY WITHOUT INTRAUTERINE PREGNANCY: Primary | ICD-10-CM

## 2018-10-03 PROCEDURE — 99999 PR PBB SHADOW E&M-EST. PATIENT-LVL III: CPT | Mod: PBBFAC,,, | Performed by: OBSTETRICS & GYNECOLOGY

## 2018-10-03 PROCEDURE — 3008F BODY MASS INDEX DOCD: CPT | Mod: CPTII,S$GLB,, | Performed by: OBSTETRICS & GYNECOLOGY

## 2018-10-03 PROCEDURE — 99212 OFFICE O/P EST SF 10 MIN: CPT | Mod: S$GLB,,, | Performed by: OBSTETRICS & GYNECOLOGY

## 2018-10-03 NOTE — PROGRESS NOTES
Chief Complaint   Patient presents with    Ectopic Pregnancy       HPI:   Bibi Molina Cargo 38 y.o.  S/p tubal reversal who was diagnosed with an ectopic pregnancy and treated with MTX on 18 is here for follow up. She reports since seeing me last week no pain and scant vaginal bleeding.       No LMP recorded.     Past Medical History:   Diagnosis Date    Anxiety     Hypothyroidism        Past Surgical History:   Procedure Laterality Date     SECTION      TUBAL LIGATION      tubal ligation reversal  2017       Family History   Problem Relation Age of Onset    Cancer Mother         throat    Breast cancer Mother        Social History     Socioeconomic History    Marital status:      Spouse name: Not on file    Number of children: Not on file    Years of education: Not on file    Highest education level: Not on file   Social Needs    Financial resource strain: Not on file    Food insecurity - worry: Not on file    Food insecurity - inability: Not on file    Transportation needs - medical: Not on file    Transportation needs - non-medical: Not on file   Occupational History    Occupation: not working   Tobacco Use    Smoking status: Former Smoker    Smokeless tobacco: Never Used    Tobacco comment: social smoker   Substance and Sexual Activity    Alcohol use: Yes     Comment: occasionally    Drug use: No    Sexual activity: Yes     Partners: Male     Comment: tubal ligation   Other Topics Concern    Not on file   Social History Narrative    Not on file       OB History      Para Term  AB Living    4 3 3   1 4    SAB TAB Ectopic Multiple Live Births        1 1 4               ROS:  GENERAL: Denies weight gain or weight loss. Feeling well overall.   SKIN: Denies rash or lesions.   HEAD: Denies headache.   CHEST: Denies chest pain   RESPIRATORY: Denies shortness of breath  ABDOMEN: No abdominal pain, constipation, diarrhea, nausea, vomiting or rectal bleeding.  "  URINARY: No frequency, dysuria, hematuria, or burning on urination.  REPRODUCTIVE: See HPI.   All other ROS negative     PE:   BP 98/62   Ht 5' 6" (1.676 m)   Wt 75 kg (165 lb 5.5 oz)   BMI 26.69 kg/m²     APPEARANCE: Well nourished, well developed, in no acute distress.  NEUROLOGIC: orientated to person, place and time, normal mood and affect   NECK: no masses, tracheal position normal, thyroid not enlarged, no masses   SKIN: no rashes or lesions  RESPIRATORY: normal respiratory effort, no use of accessory muscles   CARDIOVASCULAR: RRR, no murmurs, extremities normal with no edema    ABDOMEN: Soft. No tenderness or masses. No hernias. No hepatosplenomegaly noted.   PELVIC:   EXTERNAL GENITALIA/VULVA: No lesions. Normal female genitalia.  URETHRAL MEATUS: Normal size and location, no lesions, no prolapse.  URETHRA: No masses, tenderness, prolapse or scarring.  BLADDER: non-tender, no masses  VAGINA: Moist and well rugated, no discharge, no significant cystocele or rectocele.  CERVIX: No lesions and discharge.  UTERUS: normal size, regular shape, mobile, non-tender, bladder base nontender.  ADNEXA: No masses or tenderness.  PERINEUM: normal in appearance, no external hemorrhoids     9/22: 4109-> 6971 9/26->5027 9/29 9/22/18 TVUS: The uterus measures 9.4 x 4.8 x 6.7 cm.  The endometrial stripe is thickened and echogenic measuring 1.9 cm.  No intrauterine gestational sac is identified.  Small nonspecific calcification in the uterine fundus is noted.  The right ovary measures 3.3 x 1.9 x 2.3 cm and demonstrates a 2.2 x 2.0 x 1.9 cm ovoid hypoechoic focus with internal echoes and thickened appearing walls, possibly reflective of a corpus luteum.  There is an ovoid hypoechoic structure in left adnexa with suspected follicle suggestive of a left ovary, measuring 2.2 x 1.2 x 1.3 cm.  Adjacent to the presumed left ovary there is a complex mixed echogenicity mass measuring approximately 4.9 x 2.7 x 2.9 cm with a thick " walled cystic appearing component.  This is highly concerning for possible ectopic pregnancy.  Small amount of simple appearing fluid within the pelvis is noted.  Incidentally, a left pelvic kidney is identified.  The left pelvic kidney measures 7.0 x 4.4 x 4.5 cm and demonstrates no evidence of hydronephrosis.    9/27/18: normal uterus, right ovary with complex 1.5 cm cyst and left ovary normal with ectopic pregnancy with YS in L adnexa measuring 3.5x3x3    1. Left tubal pregnancy without intrauterine pregnancy        Plan:  1. HCG went down by 15%. Will do weekly from here on out. We discussed precautions and when to call/come back. Will see back in 3 weeks

## 2018-10-04 ENCOUNTER — LAB VISIT (OUTPATIENT)
Dept: LAB | Facility: HOSPITAL | Age: 38
End: 2018-10-04
Attending: OBSTETRICS & GYNECOLOGY
Payer: COMMERCIAL

## 2018-10-04 DIAGNOSIS — O00.102 LEFT TUBAL PREGNANCY WITHOUT INTRAUTERINE PREGNANCY: ICD-10-CM

## 2018-10-04 LAB — HCG INTACT+B SERPL-ACNC: 3215 MIU/ML

## 2018-10-04 PROCEDURE — 84702 CHORIONIC GONADOTROPIN TEST: CPT

## 2018-10-04 PROCEDURE — 36415 COLL VENOUS BLD VENIPUNCTURE: CPT | Mod: PO

## 2018-10-05 ENCOUNTER — PATIENT MESSAGE (OUTPATIENT)
Dept: OBSTETRICS AND GYNECOLOGY | Facility: CLINIC | Age: 38
End: 2018-10-05

## 2018-10-11 ENCOUNTER — LAB VISIT (OUTPATIENT)
Dept: LAB | Facility: HOSPITAL | Age: 38
End: 2018-10-11
Attending: OBSTETRICS & GYNECOLOGY
Payer: COMMERCIAL

## 2018-10-11 DIAGNOSIS — O00.102 LEFT TUBAL PREGNANCY WITHOUT INTRAUTERINE PREGNANCY: ICD-10-CM

## 2018-10-11 LAB — HCG INTACT+B SERPL-ACNC: 1077 MIU/ML

## 2018-10-11 PROCEDURE — 36415 COLL VENOUS BLD VENIPUNCTURE: CPT | Mod: PO

## 2018-10-11 PROCEDURE — 84702 CHORIONIC GONADOTROPIN TEST: CPT

## 2018-10-12 ENCOUNTER — PATIENT MESSAGE (OUTPATIENT)
Dept: OBSTETRICS AND GYNECOLOGY | Facility: CLINIC | Age: 38
End: 2018-10-12

## 2018-10-13 ENCOUNTER — PATIENT MESSAGE (OUTPATIENT)
Dept: OBSTETRICS AND GYNECOLOGY | Facility: CLINIC | Age: 38
End: 2018-10-13

## 2018-10-18 ENCOUNTER — TELEPHONE (OUTPATIENT)
Dept: INTERNAL MEDICINE | Facility: CLINIC | Age: 38
End: 2018-10-18

## 2018-10-18 ENCOUNTER — OFFICE VISIT (OUTPATIENT)
Dept: INTERNAL MEDICINE | Facility: CLINIC | Age: 38
End: 2018-10-18
Payer: COMMERCIAL

## 2018-10-18 ENCOUNTER — HOSPITAL ENCOUNTER (OUTPATIENT)
Dept: RADIOLOGY | Facility: HOSPITAL | Age: 38
Discharge: HOME | End: 2018-10-18
Attending: INTERNAL MEDICINE
Payer: COMMERCIAL

## 2018-10-18 VITALS
HEIGHT: 66 IN | OXYGEN SATURATION: 96 % | WEIGHT: 166.69 LBS | SYSTOLIC BLOOD PRESSURE: 110 MMHG | HEART RATE: 57 BPM | TEMPERATURE: 99 F | DIASTOLIC BLOOD PRESSURE: 62 MMHG | BODY MASS INDEX: 26.79 KG/M2

## 2018-10-18 DIAGNOSIS — R10.32 LLQ ABDOMINAL PAIN: ICD-10-CM

## 2018-10-18 DIAGNOSIS — O00.102 LEFT TUBAL PREGNANCY WITHOUT INTRAUTERINE PREGNANCY: ICD-10-CM

## 2018-10-18 DIAGNOSIS — R10.32 LLQ ABDOMINAL PAIN: Primary | ICD-10-CM

## 2018-10-18 LAB
BILIRUB SERPL-MCNC: ABNORMAL MG/DL
BLOOD URINE, POC: 250
COLOR, POC UA: YELLOW
GLUCOSE UR QL STRIP: NORMAL
KETONES UR QL STRIP: ABNORMAL
LEUKOCYTE ESTERASE URINE, POC: ABNORMAL
NITRITE, POC UA: ABNORMAL
PH, POC UA: 7
PROTEIN, POC: ABNORMAL
SPECIFIC GRAVITY, POC UA: 1
UROBILINOGEN, POC UA: NORMAL

## 2018-10-18 PROCEDURE — 99214 OFFICE O/P EST MOD 30 MIN: CPT | Mod: 25,S$GLB,, | Performed by: INTERNAL MEDICINE

## 2018-10-18 PROCEDURE — 76830 TRANSVAGINAL US NON-OB: CPT | Mod: TC

## 2018-10-18 PROCEDURE — 3008F BODY MASS INDEX DOCD: CPT | Mod: CPTII,S$GLB,, | Performed by: INTERNAL MEDICINE

## 2018-10-18 PROCEDURE — 76856 US EXAM PELVIC COMPLETE: CPT | Mod: 26,,, | Performed by: RADIOLOGY

## 2018-10-18 PROCEDURE — 99999 PR PBB SHADOW E&M-EST. PATIENT-LVL III: CPT | Mod: PBBFAC,,, | Performed by: INTERNAL MEDICINE

## 2018-10-18 PROCEDURE — 81002 URINALYSIS NONAUTO W/O SCOPE: CPT | Mod: S$GLB,,, | Performed by: INTERNAL MEDICINE

## 2018-10-18 PROCEDURE — 76856 US EXAM PELVIC COMPLETE: CPT | Mod: TC

## 2018-10-18 PROCEDURE — 76830 TRANSVAGINAL US NON-OB: CPT | Mod: 26,,, | Performed by: RADIOLOGY

## 2018-10-18 NOTE — PROGRESS NOTES
Subjective:       Patient ID: Bibi Lorenzo is a 38 y.o. female.    Chief Complaint: Abdominal Pain (lower left abd,side, back)    Complains of LLQ and left low back pain for several days.  Is chronically constipated.  Received methotrexate 9/22/2018 for left ectopic pregnancy.  HCG has been going down as expected but patient concerned about pain      Review of Systems   Constitutional: Negative for activity change, chills, fatigue and fever.   HENT: Negative for congestion, ear pain, nosebleeds, postnasal drip, sinus pressure and sore throat.    Eyes: Negative.  Negative for visual disturbance.   Respiratory: Negative for cough, chest tightness, shortness of breath and wheezing.    Cardiovascular: Negative for chest pain.   Gastrointestinal: Negative for abdominal pain, diarrhea, nausea and vomiting.   Genitourinary: Negative for difficulty urinating, dysuria, frequency and urgency.   Musculoskeletal: Negative for arthralgias and neck stiffness.   Skin: Negative for rash.   Neurological: Negative for dizziness, weakness and headaches.   Psychiatric/Behavioral: Negative for sleep disturbance. The patient is not nervous/anxious.        Objective:      Physical Exam   Constitutional: She is oriented to person, place, and time. She appears well-developed and well-nourished.  Non-toxic appearance. No distress.   HENT:   Head: Normocephalic and atraumatic.   Right Ear: Tympanic membrane, external ear and ear canal normal.   Left Ear: Tympanic membrane, external ear and ear canal normal.   Eyes: EOM are normal. Pupils are equal, round, and reactive to light. No scleral icterus.   Neck: Normal range of motion. Neck supple. No thyromegaly present.   Cardiovascular: Normal rate, regular rhythm and normal heart sounds.   Pulmonary/Chest: Effort normal and breath sounds normal.   Abdominal: Soft. Bowel sounds are normal. She exhibits no mass. There is tenderness in the left lower quadrant. There is no rigidity, no  rebound and no guarding.   Musculoskeletal: Normal range of motion.   Lymphadenopathy:     She has no cervical adenopathy.   Neurological: She is alert and oriented to person, place, and time. She has normal reflexes. She displays normal reflexes. No cranial nerve deficit. She exhibits normal muscle tone. Coordination normal.   Skin: Skin is warm and dry.   Psychiatric: She has a normal mood and affect. Her behavior is normal.       Assessment:       1. LLQ abdominal pain    2. Left tubal pregnancy without intrauterine pregnancy        Plan:   Bibi was seen today for abdominal pain.    Diagnoses and all orders for this visit:    LLQ abdominal pain  -     POCT urine dipstick without microscope  -     Cancel: US Pelvis Complete Non OB; Future  -     US Pelvis Comp with Transvag NON-OB (xpd); Future    Left tubal pregnancy without intrauterine pregnancy  -     Cancel: US Pelvis Complete Non OB; Future  -     US Pelvis Comp with Transvag NON-OB (xpd); Future  Spoke with gyn oncology on call.  She says US likely consistent with stage of involution.  Patients frequently don't understand that there will be pain

## 2018-10-18 NOTE — TELEPHONE ENCOUNTER
----- Message from Meche Pacheco sent at 10/18/2018  7:10 AM CDT -----  Contact: [employer]  Message from [facility] Message sent at 10/17/2018  7:10 PM CDT -----    Appointment Request From: Bibi Molina Cargo    With Provider: Morenita Beck MD [[facility]]    Preferred Date Range: 10/17/2018 - 10/18/2018    Preferred Times: Any time    Reason for visit: Pain in abdomen and back. I have a recent ectopic    Comments:  diagnosed with an ectopic received a methotrexate shot  intermittent pains in back, abdomen, and gas. told by ob office to call pcp.

## 2018-10-18 NOTE — TELEPHONE ENCOUNTER
Gave pt. A call and she re-explained most of her symptoms to me, I told her she needed to be seen in Urgent Care and scheduled her an appointment with Dr. Ronquillo. Pt. Verbalized understanding.

## 2018-10-19 ENCOUNTER — PATIENT MESSAGE (OUTPATIENT)
Dept: OBSTETRICS AND GYNECOLOGY | Facility: CLINIC | Age: 38
End: 2018-10-19

## 2018-10-25 ENCOUNTER — LAB VISIT (OUTPATIENT)
Dept: LAB | Facility: HOSPITAL | Age: 38
End: 2018-10-25
Attending: OBSTETRICS & GYNECOLOGY
Payer: COMMERCIAL

## 2018-10-25 DIAGNOSIS — O00.102 LEFT TUBAL PREGNANCY WITHOUT INTRAUTERINE PREGNANCY: ICD-10-CM

## 2018-10-25 LAB — HCG INTACT+B SERPL-ACNC: 147 MIU/ML

## 2018-10-25 PROCEDURE — 84702 CHORIONIC GONADOTROPIN TEST: CPT

## 2018-10-25 PROCEDURE — 36415 COLL VENOUS BLD VENIPUNCTURE: CPT | Mod: PO

## 2018-10-26 ENCOUNTER — PATIENT MESSAGE (OUTPATIENT)
Dept: OBSTETRICS AND GYNECOLOGY | Facility: CLINIC | Age: 38
End: 2018-10-26

## 2018-11-01 ENCOUNTER — LAB VISIT (OUTPATIENT)
Dept: LAB | Facility: HOSPITAL | Age: 38
End: 2018-11-01
Attending: OBSTETRICS & GYNECOLOGY
Payer: COMMERCIAL

## 2018-11-01 DIAGNOSIS — O00.102 LEFT TUBAL PREGNANCY WITHOUT INTRAUTERINE PREGNANCY: ICD-10-CM

## 2018-11-01 LAB — HCG INTACT+B SERPL-ACNC: 21 MIU/ML

## 2018-11-01 PROCEDURE — 84702 CHORIONIC GONADOTROPIN TEST: CPT

## 2018-11-01 PROCEDURE — 36415 COLL VENOUS BLD VENIPUNCTURE: CPT | Mod: PO

## 2018-11-02 ENCOUNTER — PATIENT MESSAGE (OUTPATIENT)
Dept: OBSTETRICS AND GYNECOLOGY | Facility: CLINIC | Age: 38
End: 2018-11-02

## 2018-11-08 RX ORDER — LEVOTHYROXINE SODIUM 100 UG/1
TABLET ORAL
Qty: 90 TABLET | Refills: 3 | Status: SHIPPED | OUTPATIENT
Start: 2018-11-08 | End: 2020-08-09

## 2018-12-03 ENCOUNTER — TELEPHONE (OUTPATIENT)
Dept: INTERNAL MEDICINE | Facility: CLINIC | Age: 38
End: 2018-12-03

## 2018-12-03 NOTE — TELEPHONE ENCOUNTER
Pt called stating she has been having anxiety attacks lately. She says it is something she has dealt with in the past. She says she was on medication that helped her but while trying to get pregnant she stopped medication. Pt says she has went through some person things lately that has been causing her to have bad anxiety. Pt is wondering if RX can be sent

## 2018-12-03 NOTE — TELEPHONE ENCOUNTER
"----- Message from Eliana Alas sent at 12/3/2018  2:11 PM CST -----  Contact: self [phone]  Patient would like to get medical advice.    Symptoms (please be specific):  Anxiety attacks    How long has patient had these symptoms:  On going    Pharmacy name and phone # (DON'T enter "on file" or "in chart"):      Comments:  She would like to speak with someone regarding having anxiefy attacks, doesn't know if she should be seen and or if her doctor can give her something for that, please call her back and advise    Thank you  "

## 2018-12-07 ENCOUNTER — OFFICE VISIT (OUTPATIENT)
Dept: INTERNAL MEDICINE | Facility: CLINIC | Age: 38
End: 2018-12-07
Payer: COMMERCIAL

## 2018-12-07 VITALS
BODY MASS INDEX: 26.26 KG/M2 | WEIGHT: 163.38 LBS | HEIGHT: 66 IN | DIASTOLIC BLOOD PRESSURE: 72 MMHG | TEMPERATURE: 99 F | HEART RATE: 81 BPM | SYSTOLIC BLOOD PRESSURE: 110 MMHG

## 2018-12-07 DIAGNOSIS — F41.9 ANXIETY: Primary | ICD-10-CM

## 2018-12-07 DIAGNOSIS — F43.21 GRIEF: ICD-10-CM

## 2018-12-07 PROBLEM — Z32.01 POSITIVE PREGNANCY TEST: Status: RESOLVED | Noted: 2018-09-11 | Resolved: 2018-12-07

## 2018-12-07 PROBLEM — O00.90 ECTOPIC PREGNANCY: Status: RESOLVED | Noted: 2018-09-22 | Resolved: 2018-12-07

## 2018-12-07 PROBLEM — O36.80X0 PREGNANCY, LOCATION UNKNOWN: Status: RESOLVED | Noted: 2018-09-21 | Resolved: 2018-12-07

## 2018-12-07 PROCEDURE — 99999 PR PBB SHADOW E&M-EST. PATIENT-LVL III: CPT | Mod: PBBFAC,,, | Performed by: INTERNAL MEDICINE

## 2018-12-07 PROCEDURE — 99214 OFFICE O/P EST MOD 30 MIN: CPT | Mod: S$GLB,,, | Performed by: INTERNAL MEDICINE

## 2018-12-07 PROCEDURE — 3008F BODY MASS INDEX DOCD: CPT | Mod: CPTII,S$GLB,, | Performed by: INTERNAL MEDICINE

## 2018-12-07 RX ORDER — ALPRAZOLAM 0.25 MG/1
0.25 TABLET ORAL 2 TIMES DAILY PRN
Qty: 15 TABLET | Refills: 0 | Status: ON HOLD | OUTPATIENT
Start: 2018-12-07 | End: 2020-05-17

## 2018-12-07 RX ORDER — VENLAFAXINE HYDROCHLORIDE 37.5 MG/1
37.5 CAPSULE, EXTENDED RELEASE ORAL DAILY
Qty: 30 CAPSULE | Refills: 3 | Status: SHIPPED | OUTPATIENT
Start: 2018-12-07 | End: 2019-02-07 | Stop reason: SDUPTHER

## 2018-12-07 NOTE — PROGRESS NOTES
"Subjective:       Patient ID: Bibi Lorenzo is a 38 y.o. female.    Chief Complaint: Anxiety    HPI   Always had some anxiety.   Recent ectopic pregnancy. Anxiety worse. Easily agitated. Cries. Rare panic attacks.   Previously on effexor 37.5 but was stopped as she was feeling better.     Review of Systems  Comprehensive review of systems otherwise negative. See history/subjective section for more details.    Objective:      Physical Exam    /72 (BP Location: Left arm, Patient Position: Sitting, BP Method: Large (Manual))   Pulse 81   Temp 98.9 °F (37.2 °C)   Ht 5' 6" (1.676 m)   Wt 74.1 kg (163 lb 5.8 oz)   LMP 12/06/2018   BMI 26.37 kg/m²     GEN - A+OX4, NAD  Psych - sad affect and mood - teary eyed.  HEENT - PERRL, OP clear. MMM.   Neck - no LAD  CV - RRR, no m/r  Chest - CTAB, no wheezing/rhonchi  Abd - S/NT/ND/+BS  Ext -2 + B radial and DP pulses. No LE edema.     Assessment/Plan     Bibi was seen today for anxiety.    Diagnoses and all orders for this visit:    Anxiety  -     venlafaxine (EFFEXOR-XR) 37.5 MG 24 hr capsule; Take 1 capsule (37.5 mg total) by mouth once daily.  -     ALPRAZolam (XANAX) 0.25 MG tablet; Take 1 tablet (0.25 mg total) by mouth 2 (two) times daily as needed for Anxiety.    Grief  -     venlafaxine (EFFEXOR-XR) 37.5 MG 24 hr capsule; Take 1 capsule (37.5 mg total) by mouth once daily.    30 minutes was spent on patient with over half the time was spent in coordination of care and/or counseling.    Follow-up in about 2 months (around 2/7/2019).      Morenita Beck MD  Department of Internal Medicine - [facility]  9:10 AM  "

## 2019-02-07 ENCOUNTER — OFFICE VISIT (OUTPATIENT)
Dept: INTERNAL MEDICINE | Facility: CLINIC | Age: 39
End: 2019-02-07
Payer: COMMERCIAL

## 2019-02-07 VITALS
HEIGHT: 66 IN | DIASTOLIC BLOOD PRESSURE: 70 MMHG | WEIGHT: 166.25 LBS | TEMPERATURE: 98 F | HEART RATE: 57 BPM | BODY MASS INDEX: 26.72 KG/M2 | SYSTOLIC BLOOD PRESSURE: 106 MMHG

## 2019-02-07 DIAGNOSIS — F41.9 ANXIETY: ICD-10-CM

## 2019-02-07 DIAGNOSIS — Z20.828 EXPOSURE TO THE FLU: ICD-10-CM

## 2019-02-07 DIAGNOSIS — F43.21 GRIEF: Primary | ICD-10-CM

## 2019-02-07 PROCEDURE — 99999 PR PBB SHADOW E&M-EST. PATIENT-LVL III: CPT | Mod: PBBFAC,,, | Performed by: INTERNAL MEDICINE

## 2019-02-07 PROCEDURE — 3008F BODY MASS INDEX DOCD: CPT | Mod: CPTII,S$GLB,, | Performed by: INTERNAL MEDICINE

## 2019-02-07 PROCEDURE — 99213 OFFICE O/P EST LOW 20 MIN: CPT | Mod: S$GLB,,, | Performed by: INTERNAL MEDICINE

## 2019-02-07 PROCEDURE — 3008F PR BODY MASS INDEX (BMI) DOCUMENTED: ICD-10-PCS | Mod: CPTII,S$GLB,, | Performed by: INTERNAL MEDICINE

## 2019-02-07 PROCEDURE — 99213 PR OFFICE/OUTPT VISIT, EST, LEVL III, 20-29 MIN: ICD-10-PCS | Mod: S$GLB,,, | Performed by: INTERNAL MEDICINE

## 2019-02-07 PROCEDURE — 99999 PR PBB SHADOW E&M-EST. PATIENT-LVL III: ICD-10-PCS | Mod: PBBFAC,,, | Performed by: INTERNAL MEDICINE

## 2019-02-07 RX ORDER — OSELTAMIVIR PHOSPHATE 75 MG/1
75 CAPSULE ORAL DAILY
Qty: 7 CAPSULE | Refills: 0 | Status: SHIPPED | OUTPATIENT
Start: 2019-02-07 | End: 2019-02-14

## 2019-02-07 RX ORDER — VENLAFAXINE HYDROCHLORIDE 37.5 MG/1
37.5 CAPSULE, EXTENDED RELEASE ORAL DAILY
Qty: 30 CAPSULE | Refills: 11 | Status: ON HOLD | OUTPATIENT
Start: 2019-02-07 | End: 2020-05-17

## 2019-02-07 NOTE — PROGRESS NOTES
"Subjective:       Patient ID: Bibi Lorenzo is a 38 y.o. female.    Chief Complaint: Follow-up    HPI  Stepdaughter is flu positive 2 days ago.   Pt had flu vaccine. Pt w/o any symptoms - no cough, fevers, myalgia, rhinorrhea, ear pain. Anshu lives w/ pt this week.     At the last visit, started on effexor xr 37.5mg daily for anxiety/grief over loss of ectopic pregnancy. Doing better. Less agitated. Able to emote. No issues on medicine.     Review of Systems  as above in HPI.     Objective:      Physical Exam    /70 (BP Location: Left arm, Patient Position: Sitting, BP Method: Large (Manual))   Pulse (!) 57   Temp 98.1 °F (36.7 °C)   Ht 5' 6" (1.676 m)   Wt 75.4 kg (166 lb 3.6 oz)   LMP 02/04/2019   BMI 26.83 kg/m²     Gen - A+OX4, NAD  HEENT - PERRL, OP clear. MMM.   Neck - no LAD  CV - RRR  Chest - CTAB, no wheezing/rhonchi  ABD - s/nt/nd/+bs  Ext - 2+ B DP and radial pulses. No LE edema.     Assessment/Plan     Bibi was seen today for follow-up.    Diagnoses and all orders for this visit:    Grief - doing well on effexor 37.5mg daily. Continue current medicine.   -     venlafaxine (EFFEXOR-XR) 37.5 MG 24 hr capsule; Take 1 capsule (37.5 mg total) by mouth once daily.    Anxiety  -     venlafaxine (EFFEXOR-XR) 37.5 MG 24 hr capsule; Take 1 capsule (37.5 mg total) by mouth once daily.    Exposure to the flu - stepdaughter is flu positive and staying w/ pt this week.   -     oseltamivir (TAMIFLU) 75 MG capsule; Take 1 capsule (75 mg total) by mouth once daily. for 7 days      Follow-up in about 1 year (around 2/7/2020), or if symptoms worsen or fail to improve.      Morenita Beck MD  Department of Internal Medicine - [facility]  9:11 AM  "

## 2019-02-18 ENCOUNTER — TELEPHONE (OUTPATIENT)
Dept: INTERNAL MEDICINE | Facility: CLINIC | Age: 39
End: 2019-02-18

## 2019-02-18 NOTE — TELEPHONE ENCOUNTER
----- Message from Albania Griffiths sent at 2/18/2019  2:49 PM CST -----  Contact: [phone]  Patient is requesting if she can get a copy of her vaccination records.    Please advise, thank you.

## 2019-02-25 ENCOUNTER — PATIENT MESSAGE (OUTPATIENT)
Dept: INTERNAL MEDICINE | Facility: CLINIC | Age: 39
End: 2019-02-25

## 2019-02-25 DIAGNOSIS — Z23 NEED FOR HEPATITIS B VACCINATION: ICD-10-CM

## 2019-02-25 DIAGNOSIS — Z78.9 IMMUNE TO VARICELLA: ICD-10-CM

## 2019-02-25 DIAGNOSIS — Z92.29 HISTORY OF MMR VACCINATION: Primary | ICD-10-CM

## 2019-02-25 NOTE — TELEPHONE ENCOUNTER
Please have pt schedule for flu. Will order varicella and MMR titers. Also will order hepatitis B. Can get via ID clinic.

## 2019-06-21 DIAGNOSIS — Z12.39 BREAST CANCER SCREENING: ICD-10-CM

## 2020-01-06 ENCOUNTER — PATIENT MESSAGE (OUTPATIENT)
Dept: INTERNAL MEDICINE | Facility: CLINIC | Age: 40
End: 2020-01-06

## 2020-05-17 PROBLEM — Z34.90 PREGNANCY: Status: ACTIVE | Noted: 2020-05-17

## 2020-05-18 PROBLEM — D62 ANEMIA DUE TO ACUTE BLOOD LOSS: Status: ACTIVE | Noted: 2020-05-18

## 2020-08-07 ENCOUNTER — PATIENT MESSAGE (OUTPATIENT)
Dept: INTERNAL MEDICINE | Facility: CLINIC | Age: 40
End: 2020-08-07

## 2020-08-07 RX ORDER — LEVOTHYROXINE SODIUM 100 UG/1
100 TABLET ORAL DAILY
Qty: 90 TABLET | Refills: 3 | Status: CANCELLED | OUTPATIENT
Start: 2020-08-07

## 2020-08-09 RX ORDER — LEVOTHYROXINE SODIUM 125 UG/1
TABLET ORAL
COMMUNITY
Start: 2020-06-26 | End: 2020-08-09 | Stop reason: SDUPTHER

## 2020-08-09 RX ORDER — LEVOTHYROXINE SODIUM 125 UG/1
125 TABLET ORAL
Qty: 90 TABLET | Refills: 0 | Status: SHIPPED | OUTPATIENT
Start: 2020-08-09 | End: 2020-08-27

## 2020-08-13 ENCOUNTER — OFFICE VISIT (OUTPATIENT)
Dept: INTERNAL MEDICINE | Facility: CLINIC | Age: 40
End: 2020-08-13
Payer: COMMERCIAL

## 2020-08-13 VITALS
HEIGHT: 66 IN | HEART RATE: 67 BPM | DIASTOLIC BLOOD PRESSURE: 70 MMHG | BODY MASS INDEX: 29.48 KG/M2 | WEIGHT: 183.44 LBS | SYSTOLIC BLOOD PRESSURE: 100 MMHG | OXYGEN SATURATION: 97 %

## 2020-08-13 DIAGNOSIS — E61.1 IRON DEFICIENCY: ICD-10-CM

## 2020-08-13 DIAGNOSIS — Z00.00 ANNUAL PHYSICAL EXAM: Primary | ICD-10-CM

## 2020-08-13 DIAGNOSIS — F41.9 ANXIETY: ICD-10-CM

## 2020-08-13 DIAGNOSIS — E03.4 HYPOTHYROIDISM DUE TO ACQUIRED ATROPHY OF THYROID: ICD-10-CM

## 2020-08-13 PROCEDURE — 99396 PREV VISIT EST AGE 40-64: CPT | Mod: S$GLB,,, | Performed by: INTERNAL MEDICINE

## 2020-08-13 PROCEDURE — 99999 PR PBB SHADOW E&M-EST. PATIENT-LVL III: CPT | Mod: PBBFAC,,, | Performed by: INTERNAL MEDICINE

## 2020-08-13 PROCEDURE — 99396 PR PREVENTIVE VISIT,EST,40-64: ICD-10-PCS | Mod: S$GLB,,, | Performed by: INTERNAL MEDICINE

## 2020-08-13 PROCEDURE — 3008F PR BODY MASS INDEX (BMI) DOCUMENTED: ICD-10-PCS | Mod: CPTII,S$GLB,, | Performed by: INTERNAL MEDICINE

## 2020-08-13 PROCEDURE — 3008F BODY MASS INDEX DOCD: CPT | Mod: CPTII,S$GLB,, | Performed by: INTERNAL MEDICINE

## 2020-08-13 PROCEDURE — 99999 PR PBB SHADOW E&M-EST. PATIENT-LVL III: ICD-10-PCS | Mod: PBBFAC,,, | Performed by: INTERNAL MEDICINE

## 2020-08-13 NOTE — PROGRESS NOTES
INTERNAL MEDICINE ANNUAL VISIT NOTE      CHIEF COMPLAINT     Chief Complaint   Patient presents with    Annual Exam       HPI     Bibi Lorenzo is a 40 y.o. C female who presents for annual.  Had a son in May 2020 via C section. No complications. Tried breast feeding but has been weaning off. Hasn't had menses yet.   16 y/o daughter had some trouble w/ quarantine.  16 y/o step daughter.   OB/GYN is Dr. Ng.    Toxic nodule w/ hyperthyroidism s/p JAVIER-->Hypothyroidism - synthroid 125mcg daily during pregnancy.     Anxiety - does have some irritability oma w/ older children. However she's not sure if this is due to hormones.     Constipation resolved since this last pregnancy. Stools are looser.    Past Medical History:  Past Medical History:   Diagnosis Date    Anxiety     Hypothyroidism        Past Surgical History:  Past Surgical History:   Procedure Laterality Date     SECTION       SECTION N/A 2020    Procedure:  SECTION;  Surgeon: Yg Tierney MD;  [facility];  Service: OB/GYN;  Laterality: N/A;    TUBAL LIGATION      tubal ligation reversal         Allergies:  Review of patient's allergies indicates:  No Known Allergies    Home Medications:  Prior to Admission medications    Medication Sig Start Date End Date Taking? Authorizing Provider   levothyroxine (SYNTHROID) 125 MCG tablet Take 1 tablet (125 mcg total) by mouth before breakfast. 20  Yes Morenita Beck MD       Family History:  Family History   Problem Relation Age of Onset    Cancer Mother         throat    Breast cancer Mother        Social History:  Social History     Tobacco Use    Smoking status: Former Smoker    Smokeless tobacco: Never Used    Tobacco comment: social smoker   Substance Use Topics    Alcohol use: Yes     Frequency: Monthly or less     Drinks per session: 1 or 2     Binge frequency: Never     Comment: occasionally    Drug use: No       Review of Systems:  Review of  "Systems   Constitutional: Negative for activity change and unexpected weight change.   HENT: Negative for hearing loss, rhinorrhea and trouble swallowing.    Eyes: Negative for discharge and visual disturbance.   Respiratory: Negative for chest tightness and wheezing.    Cardiovascular: Negative for chest pain and palpitations.   Gastrointestinal: Negative for blood in stool, constipation, diarrhea and vomiting.   Endocrine: Negative for polydipsia and polyuria.   Genitourinary: Negative for difficulty urinating, dysuria, hematuria and menstrual problem.   Musculoskeletal: Negative for arthralgias, joint swelling and neck pain.   Neurological: Negative for weakness and headaches.   Psychiatric/Behavioral: Positive for dysphoric mood. Negative for confusion.       Health Maintainence:   MMG - due but still breastfeeding.     PHYSICAL EXAM     /70 (BP Location: Left arm, Patient Position: Sitting, BP Method: Medium (Manual))   Pulse 67   Ht 5' 5.5" (1.664 m)   Wt 83.2 kg (183 lb 6.8 oz)   SpO2 97%   BMI 30.06 kg/m²     GEN - A+OX4, NAD   HEENT - PERRL, EOMI, OP clear. MMM. TM normal.   Neck - No thyromegaly or cervical LAD. No thyroid masses felt.  CV - RRR, no m/r   Chest - CTAB, no wheezing or rhonchi  Abd - S/NT/ND/+BS. C section scar looks good.   Ext - 2+BDP and radial pulses. No LE edema.   Neuro - PERRL, EOMI, no nystagmus, eyebrow raise, facial sensation, hearing, m of mastication, smile, palatal raise, shoulder shrug, tongue protrusion symmetric and intact. 5/5 BUE and BLE strength. Sensation to light touch intact throughout. 2+ DTRs. Normal gait.   MSK - No spinal tenderness to palpation. Normal gait.   Skin - No rash.    LABS     Previous labs reviewed.    ASSESSMENT/PLAN     Bibi Lorenzo is a 40 y.o. female with  iBbi was seen today for annual exam.    Diagnoses and all orders for this visit:    Annual physical exam  -     Comprehensive metabolic panel; Future; Expected date: " 08/13/2020  -     CBC auto differential; Future; Expected date: 08/13/2020  -     Hemoglobin A1C; Future; Expected date: 08/13/2020  -     Lipid Panel; Future; Expected date: 08/13/2020  -     TSH; Future; Expected date: 08/13/2020  -     T4, free; Future; Expected date: 08/13/2020  -     Vitamin D; Future; Expected date: 08/13/2020    Hypothyroidism due to acquired atrophy of thyroid - on synthroid 125mcg daily.   -     TSH; Future; Expected date: 08/13/2020  -     T4, free; Future; Expected date: 08/13/2020    Anxiety - will let me know if issues after weaning off of breastfeeding.   -     Comprehensive metabolic panel; Future; Expected date: 08/13/2020    Iron deficiency  -     CBC auto differential; Future; Expected date: 08/13/2020  -     Ferritin; Future; Expected date: 08/13/2020  -     Iron and TIBC; Future; Expected date: 08/13/2020    MMG 6 mo after delivery.    RTC in 12 months, sooner if needed and depending on labs.    Morenita Beck MD  Department of Internal Medicine - [facility]  9:49 AM

## 2020-08-25 ENCOUNTER — LAB VISIT (OUTPATIENT)
Dept: LAB | Facility: HOSPITAL | Age: 40
End: 2020-08-25
Attending: INTERNAL MEDICINE
Payer: COMMERCIAL

## 2020-08-25 DIAGNOSIS — E03.4 HYPOTHYROIDISM DUE TO ACQUIRED ATROPHY OF THYROID: ICD-10-CM

## 2020-08-25 DIAGNOSIS — F41.9 ANXIETY: ICD-10-CM

## 2020-08-25 DIAGNOSIS — E61.1 IRON DEFICIENCY: ICD-10-CM

## 2020-08-25 DIAGNOSIS — Z00.00 ANNUAL PHYSICAL EXAM: ICD-10-CM

## 2020-08-25 LAB
25(OH)D3+25(OH)D2 SERPL-MCNC: 23 NG/ML (ref 30–96)
ALBUMIN SERPL BCP-MCNC: 3.9 G/DL (ref 3.5–5.2)
ALP SERPL-CCNC: 68 U/L (ref 55–135)
ALT SERPL W/O P-5'-P-CCNC: 39 U/L (ref 10–44)
ANION GAP SERPL CALC-SCNC: 7 MMOL/L (ref 8–16)
AST SERPL-CCNC: 28 U/L (ref 10–40)
BASOPHILS # BLD AUTO: 0.05 K/UL (ref 0–0.2)
BASOPHILS NFR BLD: 0.9 % (ref 0–1.9)
BILIRUB SERPL-MCNC: 0.5 MG/DL (ref 0.1–1)
BUN SERPL-MCNC: 8 MG/DL (ref 6–20)
CALCIUM SERPL-MCNC: 9.4 MG/DL (ref 8.7–10.5)
CHLORIDE SERPL-SCNC: 106 MMOL/L (ref 95–110)
CHOLEST SERPL-MCNC: 178 MG/DL (ref 120–199)
CHOLEST/HDLC SERPL: 3.2 {RATIO} (ref 2–5)
CO2 SERPL-SCNC: 27 MMOL/L (ref 23–29)
CREAT SERPL-MCNC: 0.8 MG/DL (ref 0.5–1.4)
DIFFERENTIAL METHOD: ABNORMAL
EOSINOPHIL # BLD AUTO: 0.2 K/UL (ref 0–0.5)
EOSINOPHIL NFR BLD: 3.5 % (ref 0–8)
ERYTHROCYTE [DISTWIDTH] IN BLOOD BY AUTOMATED COUNT: 15.1 % (ref 11.5–14.5)
EST. GFR  (AFRICAN AMERICAN): >60 ML/MIN/1.73 M^2
EST. GFR  (NON AFRICAN AMERICAN): >60 ML/MIN/1.73 M^2
ESTIMATED AVG GLUCOSE: 91 MG/DL (ref 68–131)
FERRITIN SERPL-MCNC: 7 NG/ML (ref 20–300)
GLUCOSE SERPL-MCNC: 80 MG/DL (ref 70–110)
HBA1C MFR BLD HPLC: 4.8 % (ref 4–5.6)
HCT VFR BLD AUTO: 44.3 % (ref 37–48.5)
HDLC SERPL-MCNC: 56 MG/DL (ref 40–75)
HDLC SERPL: 31.5 % (ref 20–50)
HGB BLD-MCNC: 13.1 G/DL (ref 12–16)
IMM GRANULOCYTES # BLD AUTO: 0.02 K/UL (ref 0–0.04)
IMM GRANULOCYTES NFR BLD AUTO: 0.4 % (ref 0–0.5)
IRON SERPL-MCNC: 44 UG/DL (ref 30–160)
LDLC SERPL CALC-MCNC: 108.6 MG/DL (ref 63–159)
LYMPHOCYTES # BLD AUTO: 1.5 K/UL (ref 1–4.8)
LYMPHOCYTES NFR BLD: 28.4 % (ref 18–48)
MCH RBC QN AUTO: 27.3 PG (ref 27–31)
MCHC RBC AUTO-ENTMCNC: 29.6 G/DL (ref 32–36)
MCV RBC AUTO: 92 FL (ref 82–98)
MONOCYTES # BLD AUTO: 0.5 K/UL (ref 0.3–1)
MONOCYTES NFR BLD: 9.6 % (ref 4–15)
NEUTROPHILS # BLD AUTO: 3.1 K/UL (ref 1.8–7.7)
NEUTROPHILS NFR BLD: 57.2 % (ref 38–73)
NONHDLC SERPL-MCNC: 122 MG/DL
NRBC BLD-RTO: 0 /100 WBC
PLATELET # BLD AUTO: 310 K/UL (ref 150–350)
PMV BLD AUTO: 11.4 FL (ref 9.2–12.9)
POTASSIUM SERPL-SCNC: 3.7 MMOL/L (ref 3.5–5.1)
PROT SERPL-MCNC: 7 G/DL (ref 6–8.4)
RBC # BLD AUTO: 4.8 M/UL (ref 4–5.4)
SATURATED IRON: 12 % (ref 20–50)
SODIUM SERPL-SCNC: 140 MMOL/L (ref 136–145)
T4 FREE SERPL-MCNC: 1.36 NG/DL (ref 0.71–1.51)
TOTAL IRON BINDING CAPACITY: 380 UG/DL (ref 250–450)
TRANSFERRIN SERPL-MCNC: 257 MG/DL (ref 200–375)
TRIGL SERPL-MCNC: 67 MG/DL (ref 30–150)
TSH SERPL DL<=0.005 MIU/L-ACNC: 0.04 UIU/ML (ref 0.4–4)
WBC # BLD AUTO: 5.39 K/UL (ref 3.9–12.7)

## 2020-08-25 PROCEDURE — 36415 COLL VENOUS BLD VENIPUNCTURE: CPT | Mod: PN

## 2020-08-25 PROCEDURE — 80053 COMPREHEN METABOLIC PANEL: CPT

## 2020-08-25 PROCEDURE — 83540 ASSAY OF IRON: CPT

## 2020-08-25 PROCEDURE — 80061 LIPID PANEL: CPT

## 2020-08-25 PROCEDURE — 85025 COMPLETE CBC W/AUTO DIFF WBC: CPT

## 2020-08-25 PROCEDURE — 84443 ASSAY THYROID STIM HORMONE: CPT

## 2020-08-25 PROCEDURE — 84439 ASSAY OF FREE THYROXINE: CPT

## 2020-08-25 PROCEDURE — 83036 HEMOGLOBIN GLYCOSYLATED A1C: CPT

## 2020-08-25 PROCEDURE — 82728 ASSAY OF FERRITIN: CPT

## 2020-08-25 PROCEDURE — 82306 VITAMIN D 25 HYDROXY: CPT

## 2020-08-27 ENCOUNTER — TELEPHONE (OUTPATIENT)
Dept: INTERNAL MEDICINE | Facility: CLINIC | Age: 40
End: 2020-08-27

## 2020-08-27 DIAGNOSIS — E03.4 HYPOTHYROIDISM DUE TO ACQUIRED ATROPHY OF THYROID: Primary | ICD-10-CM

## 2020-08-27 DIAGNOSIS — E61.1 IRON DEFICIENCY: ICD-10-CM

## 2020-08-27 DIAGNOSIS — E55.9 VITAMIN D DEFICIENCY: ICD-10-CM

## 2020-08-27 RX ORDER — VIT C/E/ZN/COPPR/LUTEIN/ZEAXAN 250MG-90MG
1000 CAPSULE ORAL DAILY
Qty: 90 CAPSULE | Refills: 3 | Status: SHIPPED | OUTPATIENT
Start: 2020-08-27

## 2020-08-27 RX ORDER — LEVOTHYROXINE SODIUM 112 UG/1
112 TABLET ORAL
Qty: 30 TABLET | Refills: 3 | Status: SHIPPED | OUTPATIENT
Start: 2020-08-27 | End: 2020-12-30

## 2020-08-27 RX ORDER — FERROUS SULFATE 325(65) MG
325 TABLET ORAL
Qty: 90 TABLET | Refills: 1 | Status: SHIPPED | OUTPATIENT
Start: 2020-08-27 | End: 2022-02-11

## 2020-08-27 NOTE — TELEPHONE ENCOUNTER
Will order labs after follow up visit w/ me in 3 mo - can be virtual. Please assist in scheduling.

## 2020-08-27 NOTE — TELEPHONE ENCOUNTER
Spoke with pt. Pt stated she would rather come in to the office than do a virtual. Appt scheduled for December 2nd.

## 2020-08-27 NOTE — TELEPHONE ENCOUNTER
Pt informed of all results and to decrease her synthroid to 112mcg every morning. Also informed pt to get back on the ferrous sulfate 325mg and start OTc vitamin d 1,000 units daily. Pt verbally understood.     Pt requested for her labs to be put in and she'll get them done in [place].

## 2020-08-27 NOTE — TELEPHONE ENCOUNTER
Please call and let pt know that TSH is low, which means she's on too high of a thyroid dosage. Would dec to 112mcg every morning 60 min before eating or drinking anything.   She does show signs of iron deficiency again. I recommend getting back on iron supplement (ferrous sulfate 325mg) every morning with breakfast. Blood count, liver and kidney functions are normal.   Your vitamin D levels are low. Please start taking over the counter vitamin D 1000 IU daily.     Recommend a 3 mo f/u w/ me to recheck thyroid, iron and vit D levels - can be virtual if she's ok with that.

## 2020-08-28 DIAGNOSIS — Z12.39 BREAST CANCER SCREENING: ICD-10-CM

## 2020-09-28 ENCOUNTER — PATIENT MESSAGE (OUTPATIENT)
Dept: INTERNAL MEDICINE | Facility: CLINIC | Age: 40
End: 2020-09-28

## 2020-09-28 ENCOUNTER — TELEPHONE (OUTPATIENT)
Dept: INTERNAL MEDICINE | Facility: CLINIC | Age: 40
End: 2020-09-28

## 2020-09-28 DIAGNOSIS — F41.9 ANXIETY: Primary | ICD-10-CM

## 2020-09-28 NOTE — TELEPHONE ENCOUNTER
She states she had weird sexual side effects the last time, she stopped taking it when she was pregnant. She did not elaborate on those symptoms. 37.5mg. it was Venlafaxin Er, she does have some  Stress and agitation going on, she is getting to the point where she is overeacting to things, do you want to re-order the Venlafaxin or some other medication for her?  That is her question?  Thank you!

## 2020-09-29 RX ORDER — VENLAFAXINE HYDROCHLORIDE 37.5 MG/1
37.5 CAPSULE, EXTENDED RELEASE ORAL DAILY
Qty: 30 CAPSULE | Refills: 2 | Status: SHIPPED | OUTPATIENT
Start: 2020-09-29 | End: 2020-12-02

## 2020-09-30 ENCOUNTER — PATIENT MESSAGE (OUTPATIENT)
Dept: INTERNAL MEDICINE | Facility: CLINIC | Age: 40
End: 2020-09-30

## 2020-10-05 ENCOUNTER — PATIENT MESSAGE (OUTPATIENT)
Dept: ADMINISTRATIVE | Facility: HOSPITAL | Age: 40
End: 2020-10-05

## 2020-12-02 ENCOUNTER — OFFICE VISIT (OUTPATIENT)
Dept: INTERNAL MEDICINE | Facility: CLINIC | Age: 40
End: 2020-12-02
Payer: COMMERCIAL

## 2020-12-02 ENCOUNTER — IMMUNIZATION (OUTPATIENT)
Dept: INTERNAL MEDICINE | Facility: CLINIC | Age: 40
End: 2020-12-02
Payer: COMMERCIAL

## 2020-12-02 ENCOUNTER — LAB VISIT (OUTPATIENT)
Dept: LAB | Facility: HOSPITAL | Age: 40
End: 2020-12-02
Attending: INTERNAL MEDICINE
Payer: COMMERCIAL

## 2020-12-02 VITALS
WEIGHT: 180.31 LBS | OXYGEN SATURATION: 99 % | HEART RATE: 76 BPM | HEIGHT: 65 IN | BODY MASS INDEX: 30.04 KG/M2 | DIASTOLIC BLOOD PRESSURE: 82 MMHG | SYSTOLIC BLOOD PRESSURE: 124 MMHG

## 2020-12-02 DIAGNOSIS — E03.9 HYPOTHYROIDISM, UNSPECIFIED TYPE: ICD-10-CM

## 2020-12-02 DIAGNOSIS — F41.9 ANXIETY: ICD-10-CM

## 2020-12-02 DIAGNOSIS — E61.1 IRON DEFICIENCY: ICD-10-CM

## 2020-12-02 DIAGNOSIS — E55.9 VITAMIN D DEFICIENCY: ICD-10-CM

## 2020-12-02 DIAGNOSIS — F41.9 ANXIETY: Primary | ICD-10-CM

## 2020-12-02 PROBLEM — Z34.90 PREGNANCY: Status: RESOLVED | Noted: 2020-05-17 | Resolved: 2020-12-02

## 2020-12-02 LAB
25(OH)D3+25(OH)D2 SERPL-MCNC: 20 NG/ML (ref 30–96)
BASOPHILS # BLD AUTO: 0.05 K/UL (ref 0–0.2)
BASOPHILS NFR BLD: 1.1 % (ref 0–1.9)
DIFFERENTIAL METHOD: ABNORMAL
EOSINOPHIL # BLD AUTO: 0.1 K/UL (ref 0–0.5)
EOSINOPHIL NFR BLD: 1.5 % (ref 0–8)
ERYTHROCYTE [DISTWIDTH] IN BLOOD BY AUTOMATED COUNT: 14.9 % (ref 11.5–14.5)
FERRITIN SERPL-MCNC: 15 NG/ML (ref 20–300)
HCT VFR BLD AUTO: 42.3 % (ref 37–48.5)
HGB BLD-MCNC: 13.2 G/DL (ref 12–16)
IMM GRANULOCYTES # BLD AUTO: 0.02 K/UL (ref 0–0.04)
IMM GRANULOCYTES NFR BLD AUTO: 0.4 % (ref 0–0.5)
IRON SERPL-MCNC: 89 UG/DL (ref 30–160)
LYMPHOCYTES # BLD AUTO: 1.3 K/UL (ref 1–4.8)
LYMPHOCYTES NFR BLD: 29.1 % (ref 18–48)
MCH RBC QN AUTO: 28.6 PG (ref 27–31)
MCHC RBC AUTO-ENTMCNC: 31.2 G/DL (ref 32–36)
MCV RBC AUTO: 92 FL (ref 82–98)
MONOCYTES # BLD AUTO: 0.4 K/UL (ref 0.3–1)
MONOCYTES NFR BLD: 8.5 % (ref 4–15)
NEUTROPHILS # BLD AUTO: 2.7 K/UL (ref 1.8–7.7)
NEUTROPHILS NFR BLD: 59.4 % (ref 38–73)
NRBC BLD-RTO: 0 /100 WBC
PLATELET # BLD AUTO: 280 K/UL (ref 150–350)
PMV BLD AUTO: 11.6 FL (ref 9.2–12.9)
RBC # BLD AUTO: 4.61 M/UL (ref 4–5.4)
SATURATED IRON: 24 % (ref 20–50)
T4 FREE SERPL-MCNC: 1.14 NG/DL (ref 0.71–1.51)
TOTAL IRON BINDING CAPACITY: 367 UG/DL (ref 250–450)
TRANSFERRIN SERPL-MCNC: 248 MG/DL (ref 200–375)
TSH SERPL DL<=0.005 MIU/L-ACNC: 0.55 UIU/ML (ref 0.4–4)
WBC # BLD AUTO: 4.61 K/UL (ref 3.9–12.7)

## 2020-12-02 PROCEDURE — 82728 ASSAY OF FERRITIN: CPT

## 2020-12-02 PROCEDURE — 84443 ASSAY THYROID STIM HORMONE: CPT

## 2020-12-02 PROCEDURE — 1126F AMNT PAIN NOTED NONE PRSNT: CPT | Mod: S$GLB,,, | Performed by: INTERNAL MEDICINE

## 2020-12-02 PROCEDURE — 1126F PR PAIN SEVERITY QUANTIFIED, NO PAIN PRESENT: ICD-10-PCS | Mod: S$GLB,,, | Performed by: INTERNAL MEDICINE

## 2020-12-02 PROCEDURE — 90686 IIV4 VACC NO PRSV 0.5 ML IM: CPT | Mod: S$GLB,,, | Performed by: INTERNAL MEDICINE

## 2020-12-02 PROCEDURE — 85025 COMPLETE CBC W/AUTO DIFF WBC: CPT

## 2020-12-02 PROCEDURE — 99999 PR PBB SHADOW E&M-EST. PATIENT-LVL III: ICD-10-PCS | Mod: PBBFAC,,, | Performed by: INTERNAL MEDICINE

## 2020-12-02 PROCEDURE — 90686 FLU VACCINE (QUAD) GREATER THAN OR EQUAL TO 3YO PRESERVATIVE FREE IM: ICD-10-PCS | Mod: S$GLB,,, | Performed by: INTERNAL MEDICINE

## 2020-12-02 PROCEDURE — 90471 IMMUNIZATION ADMIN: CPT | Mod: S$GLB,,, | Performed by: INTERNAL MEDICINE

## 2020-12-02 PROCEDURE — 36415 COLL VENOUS BLD VENIPUNCTURE: CPT

## 2020-12-02 PROCEDURE — 83540 ASSAY OF IRON: CPT

## 2020-12-02 PROCEDURE — 3008F PR BODY MASS INDEX (BMI) DOCUMENTED: ICD-10-PCS | Mod: CPTII,S$GLB,, | Performed by: INTERNAL MEDICINE

## 2020-12-02 PROCEDURE — 90471 FLU VACCINE (QUAD) GREATER THAN OR EQUAL TO 3YO PRESERVATIVE FREE IM: ICD-10-PCS | Mod: S$GLB,,, | Performed by: INTERNAL MEDICINE

## 2020-12-02 PROCEDURE — 99999 PR PBB SHADOW E&M-EST. PATIENT-LVL III: CPT | Mod: PBBFAC,,, | Performed by: INTERNAL MEDICINE

## 2020-12-02 PROCEDURE — 99214 OFFICE O/P EST MOD 30 MIN: CPT | Mod: 25,S$GLB,, | Performed by: INTERNAL MEDICINE

## 2020-12-02 PROCEDURE — 84439 ASSAY OF FREE THYROXINE: CPT

## 2020-12-02 PROCEDURE — 3008F BODY MASS INDEX DOCD: CPT | Mod: CPTII,S$GLB,, | Performed by: INTERNAL MEDICINE

## 2020-12-02 PROCEDURE — 82306 VITAMIN D 25 HYDROXY: CPT

## 2020-12-02 PROCEDURE — 99214 PR OFFICE/OUTPT VISIT, EST, LEVL IV, 30-39 MIN: ICD-10-PCS | Mod: 25,S$GLB,, | Performed by: INTERNAL MEDICINE

## 2020-12-02 RX ORDER — VENLAFAXINE HYDROCHLORIDE 75 MG/1
75 CAPSULE, EXTENDED RELEASE ORAL DAILY
Qty: 30 CAPSULE | Refills: 3 | Status: SHIPPED | OUTPATIENT
Start: 2020-12-02 | End: 2021-05-10

## 2020-12-02 NOTE — PROGRESS NOTES
"Subjective:       Patient ID: Bibi Lorenzo is a 40 y.o. female.    Chief Complaint: Follow-up    HPI   Anxiety - restarted on effexor xr 37.5mg daily at the end of Sept 2020.  Reports lots of stressors. Feel that effexor is helping w/ coping. Mood has been a little difficult to .   celexa-->dizziness and sexual side effects    Hypothyroidism - TSH was low at last visit and dec synthroid to 112mcg daily (from 125mcg daily).   Restarted on iron supplements (ferritin of 7 back in Aug; sometimes forgets to take it) and vitamin D 1000 u OTC daily also (Vit D 23 8/25/20; sometimes may forget it). No longer w/ restless legs at night.     Review of Systems  Comprehensive review of systems otherwise negative. See history/subjective section for more details.    Objective:      Physical Exam    /82 (BP Location: Left arm, Patient Position: Sitting, BP Method: Medium (Manual))   Pulse 76   Ht 5' 5" (1.651 m)   Wt 81.8 kg (180 lb 5.4 oz)   LMP 11/12/2020   SpO2 99%   Breastfeeding No   BMI 30.01 kg/m²     GEN - A+OX4, NAD   HEENT - PERRL, EOMI, OP clear. MMM.   Neck - No thyromegaly or cervical LAD. No thyroid masses felt.  CV - RRR, no m/r   Chest - CTAB, no wheezing or rhonchi  Abd - S/NT/ND/+BS.   Ext - 2+BDP and radial pulses. No C/C/E.  Skin - No rash.    LABS REVIEWED.    Assessment/Plan     Bibi was seen today for follow-up.    Diagnoses and all orders for this visit:    Anxiety - inc effexor to 75mg daily.   -     T4, Free; Future  -     TSH; Future  -     venlafaxine (EFFEXOR-XR) 75 MG 24 hr capsule; Take 1 capsule (75 mg total) by mouth once daily.    Hypothyroidism, unspecified type - dec synthroid at last visit. Repeat TFT.   -     T4, Free; Future  -     TSH; Future    Iron deficiency - on iron supplements. May miss some dosages.   -     CBC Auto Differential; Future  -     Ferritin; Future  -     Iron and TIBC; Future    Vitamin D deficiency - on D OTC but may sometimes miss some " dosage.   -     Vitamin D; Future    Flu vaccine today.    Follow up in about 3 months (around 3/2/2021). pt preferred in person appt.      Morenita Beck MD  Department of Internal Medicine - [facility]  10:12 AM

## 2021-01-04 ENCOUNTER — PATIENT MESSAGE (OUTPATIENT)
Dept: ADMINISTRATIVE | Facility: HOSPITAL | Age: 41
End: 2021-01-04

## 2021-01-21 ENCOUNTER — PATIENT MESSAGE (OUTPATIENT)
Dept: ADMINISTRATIVE | Facility: HOSPITAL | Age: 41
End: 2021-01-21

## 2021-01-21 ENCOUNTER — PATIENT OUTREACH (OUTPATIENT)
Dept: ADMINISTRATIVE | Facility: HOSPITAL | Age: 41
End: 2021-01-21

## 2021-01-21 DIAGNOSIS — Z11.59 NEED FOR HEPATITIS C SCREENING TEST: Primary | ICD-10-CM

## 2021-01-26 DIAGNOSIS — E03.4 HYPOTHYROIDISM DUE TO ACQUIRED ATROPHY OF THYROID: ICD-10-CM

## 2021-01-27 ENCOUNTER — PATIENT OUTREACH (OUTPATIENT)
Dept: ADMINISTRATIVE | Facility: HOSPITAL | Age: 41
End: 2021-01-27

## 2021-01-27 RX ORDER — LEVOTHYROXINE SODIUM 112 UG/1
TABLET ORAL
Qty: 30 TABLET | Refills: 11 | Status: SHIPPED | OUTPATIENT
Start: 2021-01-27 | End: 2022-02-11 | Stop reason: SDUPTHER

## 2021-01-28 ENCOUNTER — PATIENT MESSAGE (OUTPATIENT)
Dept: INTERNAL MEDICINE | Facility: CLINIC | Age: 41
End: 2021-01-28

## 2021-01-29 ENCOUNTER — PATIENT MESSAGE (OUTPATIENT)
Dept: INTERNAL MEDICINE | Facility: CLINIC | Age: 41
End: 2021-01-29

## 2021-01-30 ENCOUNTER — HOSPITAL ENCOUNTER (OUTPATIENT)
Dept: RADIOLOGY | Facility: HOSPITAL | Age: 41
Discharge: HOME | End: 2021-01-30
Attending: INTERNAL MEDICINE
Payer: COMMERCIAL

## 2021-01-30 DIAGNOSIS — Z12.39 BREAST CANCER SCREENING: ICD-10-CM

## 2021-01-30 PROCEDURE — 77067 SCR MAMMO BI INCL CAD: CPT | Mod: TC,PO

## 2021-01-30 PROCEDURE — 77067 MAMMO DIGITAL SCREENING BILAT WITH TOMOSYNTHESIS_CAD: ICD-10-PCS | Mod: 26,,, | Performed by: RADIOLOGY

## 2021-01-30 PROCEDURE — 77067 SCR MAMMO BI INCL CAD: CPT | Mod: 26,,, | Performed by: RADIOLOGY

## 2021-01-30 PROCEDURE — 77063 BREAST TOMOSYNTHESIS BI: CPT | Mod: 26,,, | Performed by: RADIOLOGY

## 2021-01-30 PROCEDURE — 77063 MAMMO DIGITAL SCREENING BILAT WITH TOMOSYNTHESIS_CAD: ICD-10-PCS | Mod: 26,,, | Performed by: RADIOLOGY

## 2021-02-04 ENCOUNTER — OFFICE VISIT (OUTPATIENT)
Dept: INTERNAL MEDICINE | Facility: CLINIC | Age: 41
End: 2021-02-04
Payer: COMMERCIAL

## 2021-02-04 DIAGNOSIS — F41.9 ANXIETY: Primary | ICD-10-CM

## 2021-02-04 DIAGNOSIS — E61.1 IRON DEFICIENCY: ICD-10-CM

## 2021-02-04 DIAGNOSIS — E55.9 VITAMIN D DEFICIENCY: ICD-10-CM

## 2021-02-04 DIAGNOSIS — E03.4 HYPOTHYROIDISM DUE TO ACQUIRED ATROPHY OF THYROID: ICD-10-CM

## 2021-02-04 PROCEDURE — 99214 OFFICE O/P EST MOD 30 MIN: CPT | Mod: 95,,, | Performed by: INTERNAL MEDICINE

## 2021-02-04 PROCEDURE — 99214 PR OFFICE/OUTPT VISIT, EST, LEVL IV, 30-39 MIN: ICD-10-PCS | Mod: 95,,, | Performed by: INTERNAL MEDICINE

## 2021-04-29 ENCOUNTER — PATIENT MESSAGE (OUTPATIENT)
Dept: RESEARCH | Facility: HOSPITAL | Age: 41
End: 2021-04-29

## 2021-10-10 ENCOUNTER — NURSE TRIAGE (OUTPATIENT)
Dept: ADMINISTRATIVE | Facility: CLINIC | Age: 41
End: 2021-10-10

## 2021-10-12 ENCOUNTER — TELEPHONE (OUTPATIENT)
Dept: INTERNAL MEDICINE | Facility: CLINIC | Age: 41
End: 2021-10-12

## 2021-10-18 ENCOUNTER — OFFICE VISIT (OUTPATIENT)
Dept: INTERNAL MEDICINE | Facility: CLINIC | Age: 41
End: 2021-10-18
Payer: COMMERCIAL

## 2021-10-18 ENCOUNTER — IMMUNIZATION (OUTPATIENT)
Dept: INTERNAL MEDICINE | Facility: CLINIC | Age: 41
End: 2021-10-18
Payer: COMMERCIAL

## 2021-10-18 VITALS
OXYGEN SATURATION: 99 % | HEIGHT: 65 IN | SYSTOLIC BLOOD PRESSURE: 110 MMHG | TEMPERATURE: 99 F | DIASTOLIC BLOOD PRESSURE: 80 MMHG | WEIGHT: 192.44 LBS | HEART RATE: 72 BPM | BODY MASS INDEX: 32.06 KG/M2

## 2021-10-18 DIAGNOSIS — E03.4 HYPOTHYROIDISM DUE TO ACQUIRED ATROPHY OF THYROID: Primary | ICD-10-CM

## 2021-10-18 DIAGNOSIS — Z13.220 ENCOUNTER FOR LIPID SCREENING FOR CARDIOVASCULAR DISEASE: ICD-10-CM

## 2021-10-18 DIAGNOSIS — E61.1 IRON DEFICIENCY: ICD-10-CM

## 2021-10-18 DIAGNOSIS — F41.9 ANXIETY: ICD-10-CM

## 2021-10-18 DIAGNOSIS — M54.50 ACUTE RIGHT-SIDED LOW BACK PAIN WITHOUT SCIATICA: ICD-10-CM

## 2021-10-18 DIAGNOSIS — E55.9 VITAMIN D DEFICIENCY: ICD-10-CM

## 2021-10-18 DIAGNOSIS — Z13.6 ENCOUNTER FOR LIPID SCREENING FOR CARDIOVASCULAR DISEASE: ICD-10-CM

## 2021-10-18 DIAGNOSIS — N92.0 MENORRHAGIA WITH REGULAR CYCLE: ICD-10-CM

## 2021-10-18 PROCEDURE — 99214 PR OFFICE/OUTPT VISIT, EST, LEVL IV, 30-39 MIN: ICD-10-PCS | Mod: 25,S$GLB,, | Performed by: INTERNAL MEDICINE

## 2021-10-18 PROCEDURE — 3008F PR BODY MASS INDEX (BMI) DOCUMENTED: ICD-10-PCS | Mod: CPTII,S$GLB,, | Performed by: INTERNAL MEDICINE

## 2021-10-18 PROCEDURE — 1159F MED LIST DOCD IN RCRD: CPT | Mod: CPTII,S$GLB,, | Performed by: INTERNAL MEDICINE

## 2021-10-18 PROCEDURE — 3074F SYST BP LT 130 MM HG: CPT | Mod: CPTII,S$GLB,, | Performed by: INTERNAL MEDICINE

## 2021-10-18 PROCEDURE — 99999 PR PBB SHADOW E&M-EST. PATIENT-LVL III: ICD-10-PCS | Mod: PBBFAC,,, | Performed by: INTERNAL MEDICINE

## 2021-10-18 PROCEDURE — 3079F PR MOST RECENT DIASTOLIC BLOOD PRESSURE 80-89 MM HG: ICD-10-PCS | Mod: CPTII,S$GLB,, | Performed by: INTERNAL MEDICINE

## 2021-10-18 PROCEDURE — 90686 IIV4 VACC NO PRSV 0.5 ML IM: CPT | Mod: S$GLB,,, | Performed by: INTERNAL MEDICINE

## 2021-10-18 PROCEDURE — 99999 PR PBB SHADOW E&M-EST. PATIENT-LVL III: CPT | Mod: PBBFAC,,, | Performed by: INTERNAL MEDICINE

## 2021-10-18 PROCEDURE — 90471 FLU VACCINE (QUAD) GREATER THAN OR EQUAL TO 3YO PRESERVATIVE FREE IM: ICD-10-PCS | Mod: S$GLB,,, | Performed by: INTERNAL MEDICINE

## 2021-10-18 PROCEDURE — 1159F PR MEDICATION LIST DOCUMENTED IN MEDICAL RECORD: ICD-10-PCS | Mod: CPTII,S$GLB,, | Performed by: INTERNAL MEDICINE

## 2021-10-18 PROCEDURE — 3074F PR MOST RECENT SYSTOLIC BLOOD PRESSURE < 130 MM HG: ICD-10-PCS | Mod: CPTII,S$GLB,, | Performed by: INTERNAL MEDICINE

## 2021-10-18 PROCEDURE — 99214 OFFICE O/P EST MOD 30 MIN: CPT | Mod: 25,S$GLB,, | Performed by: INTERNAL MEDICINE

## 2021-10-18 PROCEDURE — 90471 IMMUNIZATION ADMIN: CPT | Mod: S$GLB,,, | Performed by: INTERNAL MEDICINE

## 2021-10-18 PROCEDURE — 3008F BODY MASS INDEX DOCD: CPT | Mod: CPTII,S$GLB,, | Performed by: INTERNAL MEDICINE

## 2021-10-18 PROCEDURE — 1160F PR REVIEW ALL MEDS BY PRESCRIBER/CLIN PHARMACIST DOCUMENTED: ICD-10-PCS | Mod: CPTII,S$GLB,, | Performed by: INTERNAL MEDICINE

## 2021-10-18 PROCEDURE — 90686 FLU VACCINE (QUAD) GREATER THAN OR EQUAL TO 3YO PRESERVATIVE FREE IM: ICD-10-PCS | Mod: S$GLB,,, | Performed by: INTERNAL MEDICINE

## 2021-10-18 PROCEDURE — 3079F DIAST BP 80-89 MM HG: CPT | Mod: CPTII,S$GLB,, | Performed by: INTERNAL MEDICINE

## 2021-10-18 PROCEDURE — 1160F RVW MEDS BY RX/DR IN RCRD: CPT | Mod: CPTII,S$GLB,, | Performed by: INTERNAL MEDICINE

## 2021-12-21 DIAGNOSIS — F41.9 ANXIETY: ICD-10-CM

## 2021-12-21 RX ORDER — VENLAFAXINE HYDROCHLORIDE 75 MG/1
CAPSULE, EXTENDED RELEASE ORAL
Qty: 30 CAPSULE | Refills: 3 | Status: SHIPPED | OUTPATIENT
Start: 2021-12-21 | End: 2022-02-11 | Stop reason: SDUPTHER

## 2021-12-21 NOTE — TELEPHONE ENCOUNTER
----- Message from Salma Lala sent at 12/21/2021 11:51 AM CST -----  Contact: Self/[phone]  Requesting an RX refill or new RX.  Is this a refill or new RX: New   RX name and strength: venlafaxine (EFFEXOR-XR) 75 MG 24 hr capsule  Is this a 30 day or 90 day RX: 30  Patient advised that in the future they can use their [facility] account to request a refill?:  yes   [id number] [address]  [address] [address]  Phone: [phone] Fax: [phone]

## 2021-12-21 NOTE — TELEPHONE ENCOUNTER
Care Due:                  Date            Visit Type   Department     Provider  --------------------------------------------------------------------------------                                             [facility]  Last Visit: 10-      None         MEDICINE       Morenita Beck  Next Visit: None Scheduled  None         None Found                                                            Last  Test          Frequency    Reason                     Performed    Due Date  --------------------------------------------------------------------------------    Cr..........  12 months..  venlafaxine..............  08- 08-    Powered by [facility] [employer]. Reference number: [id number].   12/21/2021 1:39:04 PM CST

## 2021-12-23 ENCOUNTER — LAB VISIT (OUTPATIENT)
Dept: LAB | Facility: HOSPITAL | Age: 41
End: 2021-12-23
Attending: INTERNAL MEDICINE
Payer: COMMERCIAL

## 2021-12-23 DIAGNOSIS — N92.0 MENORRHAGIA WITH REGULAR CYCLE: ICD-10-CM

## 2021-12-23 DIAGNOSIS — E61.1 IRON DEFICIENCY: ICD-10-CM

## 2021-12-23 DIAGNOSIS — E03.4 HYPOTHYROIDISM DUE TO ACQUIRED ATROPHY OF THYROID: ICD-10-CM

## 2021-12-23 DIAGNOSIS — Z11.59 NEED FOR HEPATITIS C SCREENING TEST: ICD-10-CM

## 2021-12-23 DIAGNOSIS — F41.9 ANXIETY: ICD-10-CM

## 2021-12-23 DIAGNOSIS — E55.9 VITAMIN D DEFICIENCY: ICD-10-CM

## 2021-12-23 DIAGNOSIS — Z13.220 ENCOUNTER FOR LIPID SCREENING FOR CARDIOVASCULAR DISEASE: ICD-10-CM

## 2021-12-23 DIAGNOSIS — Z13.6 ENCOUNTER FOR LIPID SCREENING FOR CARDIOVASCULAR DISEASE: ICD-10-CM

## 2021-12-23 LAB
25(OH)D3+25(OH)D2 SERPL-MCNC: 28 NG/ML (ref 30–96)
ALBUMIN SERPL BCP-MCNC: 3.7 G/DL (ref 3.5–5.2)
ALP SERPL-CCNC: 61 U/L (ref 55–135)
ALT SERPL W/O P-5'-P-CCNC: 17 U/L (ref 10–44)
ANION GAP SERPL CALC-SCNC: 6 MMOL/L (ref 8–16)
AST SERPL-CCNC: 18 U/L (ref 10–40)
BASOPHILS # BLD AUTO: 0.05 K/UL (ref 0–0.2)
BASOPHILS NFR BLD: 1 % (ref 0–1.9)
BILIRUB SERPL-MCNC: 0.6 MG/DL (ref 0.1–1)
BUN SERPL-MCNC: 9 MG/DL (ref 6–20)
CALCIUM SERPL-MCNC: 9.3 MG/DL (ref 8.7–10.5)
CHLORIDE SERPL-SCNC: 105 MMOL/L (ref 95–110)
CHOLEST SERPL-MCNC: 171 MG/DL (ref 120–199)
CHOLEST/HDLC SERPL: 3.3 {RATIO} (ref 2–5)
CO2 SERPL-SCNC: 27 MMOL/L (ref 23–29)
CREAT SERPL-MCNC: 0.8 MG/DL (ref 0.5–1.4)
DIFFERENTIAL METHOD: ABNORMAL
EOSINOPHIL # BLD AUTO: 0.2 K/UL (ref 0–0.5)
EOSINOPHIL NFR BLD: 3.8 % (ref 0–8)
ERYTHROCYTE [DISTWIDTH] IN BLOOD BY AUTOMATED COUNT: 14.6 % (ref 11.5–14.5)
EST. GFR  (AFRICAN AMERICAN): >60 ML/MIN/1.73 M^2
EST. GFR  (NON AFRICAN AMERICAN): >60 ML/MIN/1.73 M^2
ESTRADIOL SERPL-MCNC: 47 PG/ML
FERRITIN SERPL-MCNC: 12 NG/ML (ref 20–300)
FSH SERPL-ACNC: 9.89 MIU/ML
GLUCOSE SERPL-MCNC: 86 MG/DL (ref 70–110)
HCT VFR BLD AUTO: 40.3 % (ref 37–48.5)
HDLC SERPL-MCNC: 52 MG/DL (ref 40–75)
HDLC SERPL: 30.4 % (ref 20–50)
HGB BLD-MCNC: 12.4 G/DL (ref 12–16)
IMM GRANULOCYTES # BLD AUTO: 0.01 K/UL (ref 0–0.04)
IMM GRANULOCYTES NFR BLD AUTO: 0.2 % (ref 0–0.5)
IRON SERPL-MCNC: 50 UG/DL (ref 30–160)
LDLC SERPL CALC-MCNC: 106.4 MG/DL (ref 63–159)
LH SERPL-ACNC: 7 MIU/ML
LYMPHOCYTES # BLD AUTO: 1.7 K/UL (ref 1–4.8)
LYMPHOCYTES NFR BLD: 33.1 % (ref 18–48)
MCH RBC QN AUTO: 28.2 PG (ref 27–31)
MCHC RBC AUTO-ENTMCNC: 30.8 G/DL (ref 32–36)
MCV RBC AUTO: 92 FL (ref 82–98)
MONOCYTES # BLD AUTO: 0.5 K/UL (ref 0.3–1)
MONOCYTES NFR BLD: 10.4 % (ref 4–15)
NEUTROPHILS # BLD AUTO: 2.6 K/UL (ref 1.8–7.7)
NEUTROPHILS NFR BLD: 51.5 % (ref 38–73)
NONHDLC SERPL-MCNC: 119 MG/DL
NRBC BLD-RTO: 0 /100 WBC
PLATELET # BLD AUTO: 338 K/UL (ref 150–450)
PMV BLD AUTO: 11.1 FL (ref 9.2–12.9)
POTASSIUM SERPL-SCNC: 4.1 MMOL/L (ref 3.5–5.1)
PROT SERPL-MCNC: 6.5 G/DL (ref 6–8.4)
RBC # BLD AUTO: 4.4 M/UL (ref 4–5.4)
SATURATED IRON: 13 % (ref 20–50)
SODIUM SERPL-SCNC: 138 MMOL/L (ref 136–145)
T4 FREE SERPL-MCNC: 1.02 NG/DL (ref 0.71–1.51)
TOTAL IRON BINDING CAPACITY: 398 UG/DL (ref 250–450)
TRANSFERRIN SERPL-MCNC: 269 MG/DL (ref 200–375)
TRIGL SERPL-MCNC: 63 MG/DL (ref 30–150)
TSH SERPL DL<=0.005 MIU/L-ACNC: 2.47 UIU/ML (ref 0.4–4)
WBC # BLD AUTO: 5.02 K/UL (ref 3.9–12.7)

## 2021-12-23 PROCEDURE — 36415 COLL VENOUS BLD VENIPUNCTURE: CPT | Mod: PO | Performed by: INTERNAL MEDICINE

## 2021-12-23 PROCEDURE — 82728 ASSAY OF FERRITIN: CPT | Performed by: INTERNAL MEDICINE

## 2021-12-23 PROCEDURE — 82670 ASSAY OF TOTAL ESTRADIOL: CPT | Performed by: INTERNAL MEDICINE

## 2021-12-23 PROCEDURE — 80053 COMPREHEN METABOLIC PANEL: CPT | Performed by: INTERNAL MEDICINE

## 2021-12-23 PROCEDURE — 85025 COMPLETE CBC W/AUTO DIFF WBC: CPT | Performed by: INTERNAL MEDICINE

## 2021-12-23 PROCEDURE — 80061 LIPID PANEL: CPT | Performed by: INTERNAL MEDICINE

## 2021-12-23 PROCEDURE — 84439 ASSAY OF FREE THYROXINE: CPT | Performed by: INTERNAL MEDICINE

## 2021-12-23 PROCEDURE — 82306 VITAMIN D 25 HYDROXY: CPT | Performed by: INTERNAL MEDICINE

## 2021-12-23 PROCEDURE — 83001 ASSAY OF GONADOTROPIN (FSH): CPT | Performed by: INTERNAL MEDICINE

## 2021-12-23 PROCEDURE — 86803 HEPATITIS C AB TEST: CPT | Performed by: INTERNAL MEDICINE

## 2021-12-23 PROCEDURE — 83002 ASSAY OF GONADOTROPIN (LH): CPT | Performed by: INTERNAL MEDICINE

## 2021-12-23 PROCEDURE — 84443 ASSAY THYROID STIM HORMONE: CPT | Performed by: INTERNAL MEDICINE

## 2021-12-23 PROCEDURE — 84466 ASSAY OF TRANSFERRIN: CPT | Performed by: INTERNAL MEDICINE

## 2021-12-24 LAB — HCV AB SERPL QL IA: NEGATIVE

## 2021-12-27 ENCOUNTER — PATIENT MESSAGE (OUTPATIENT)
Dept: INTERNAL MEDICINE | Facility: CLINIC | Age: 41
End: 2021-12-27
Payer: COMMERCIAL

## 2022-02-11 PROBLEM — E66.09 CLASS 1 OBESITY DUE TO EXCESS CALORIES WITHOUT SERIOUS COMORBIDITY WITH BODY MASS INDEX (BMI) OF 31.0 TO 31.9 IN ADULT: Status: ACTIVE | Noted: 2022-02-11

## 2022-02-11 PROBLEM — E66.811 CLASS 1 OBESITY DUE TO EXCESS CALORIES WITHOUT SERIOUS COMORBIDITY WITH BODY MASS INDEX (BMI) OF 31.0 TO 31.9 IN ADULT: Status: ACTIVE | Noted: 2022-02-11

## 2023-04-05 ENCOUNTER — LAB VISIT (OUTPATIENT)
Dept: LAB | Facility: HOSPITAL | Age: 43
End: 2023-04-05
Attending: FAMILY MEDICINE
Payer: COMMERCIAL

## 2023-04-05 DIAGNOSIS — E03.4 HYPOTHYROIDISM DUE TO ACQUIRED ATROPHY OF THYROID: ICD-10-CM

## 2023-04-05 DIAGNOSIS — Z00.00 ROUTINE GENERAL MEDICAL EXAMINATION AT HEALTH CARE FACILITY: ICD-10-CM

## 2023-04-05 DIAGNOSIS — E55.9 VITAMIN D DEFICIENCY: ICD-10-CM

## 2023-04-05 LAB
25(OH)D3+25(OH)D2 SERPL-MCNC: 25 NG/ML (ref 30–96)
ALBUMIN SERPL BCP-MCNC: 3.8 G/DL (ref 3.5–5.2)
ALP SERPL-CCNC: 57 U/L (ref 55–135)
ALT SERPL W/O P-5'-P-CCNC: 10 U/L (ref 10–44)
ANION GAP SERPL CALC-SCNC: 13 MMOL/L (ref 8–16)
AST SERPL-CCNC: 21 U/L (ref 10–40)
BASOPHILS # BLD AUTO: 0.05 K/UL (ref 0–0.2)
BASOPHILS NFR BLD: 1.1 % (ref 0–1.9)
BILIRUB SERPL-MCNC: 0.5 MG/DL (ref 0.1–1)
BUN SERPL-MCNC: 13 MG/DL (ref 6–20)
CALCIUM SERPL-MCNC: 9.3 MG/DL (ref 8.7–10.5)
CHLORIDE SERPL-SCNC: 104 MMOL/L (ref 95–110)
CHOLEST SERPL-MCNC: 158 MG/DL (ref 120–199)
CHOLEST/HDLC SERPL: 2.9 {RATIO} (ref 2–5)
CO2 SERPL-SCNC: 21 MMOL/L (ref 23–29)
CREAT SERPL-MCNC: 0.8 MG/DL (ref 0.5–1.4)
DIFFERENTIAL METHOD: ABNORMAL
EOSINOPHIL # BLD AUTO: 0.2 K/UL (ref 0–0.5)
EOSINOPHIL NFR BLD: 3.4 % (ref 0–8)
ERYTHROCYTE [DISTWIDTH] IN BLOOD BY AUTOMATED COUNT: 17.9 % (ref 11.5–14.5)
EST. GFR  (NO RACE VARIABLE): >60 ML/MIN/1.73 M^2
ESTIMATED AVG GLUCOSE: 91 MG/DL (ref 68–131)
GLUCOSE SERPL-MCNC: 70 MG/DL (ref 70–110)
HBA1C MFR BLD: 4.8 % (ref 4–5.6)
HCT VFR BLD AUTO: 34.1 % (ref 37–48.5)
HDLC SERPL-MCNC: 55 MG/DL (ref 40–75)
HDLC SERPL: 34.8 % (ref 20–50)
HGB BLD-MCNC: 10.4 G/DL (ref 12–16)
IMM GRANULOCYTES # BLD AUTO: 0.01 K/UL (ref 0–0.04)
IMM GRANULOCYTES NFR BLD AUTO: 0.2 % (ref 0–0.5)
LDLC SERPL CALC-MCNC: 93.2 MG/DL (ref 63–159)
LYMPHOCYTES # BLD AUTO: 1.3 K/UL (ref 1–4.8)
LYMPHOCYTES NFR BLD: 26.5 % (ref 18–48)
MCH RBC QN AUTO: 24.6 PG (ref 27–31)
MCHC RBC AUTO-ENTMCNC: 30.5 G/DL (ref 32–36)
MCV RBC AUTO: 81 FL (ref 82–98)
MONOCYTES # BLD AUTO: 0.5 K/UL (ref 0.3–1)
MONOCYTES NFR BLD: 10.1 % (ref 4–15)
NEUTROPHILS # BLD AUTO: 2.8 K/UL (ref 1.8–7.7)
NEUTROPHILS NFR BLD: 58.7 % (ref 38–73)
NONHDLC SERPL-MCNC: 103 MG/DL
NRBC BLD-RTO: 0 /100 WBC
PLATELET # BLD AUTO: 399 K/UL (ref 150–450)
PMV BLD AUTO: 10.8 FL (ref 9.2–12.9)
POTASSIUM SERPL-SCNC: 4.1 MMOL/L (ref 3.5–5.1)
PROT SERPL-MCNC: 6.7 G/DL (ref 6–8.4)
RBC # BLD AUTO: 4.23 M/UL (ref 4–5.4)
SODIUM SERPL-SCNC: 138 MMOL/L (ref 136–145)
TRIGL SERPL-MCNC: 49 MG/DL (ref 30–150)
TSH SERPL DL<=0.005 MIU/L-ACNC: 2.81 UIU/ML (ref 0.4–4)
WBC # BLD AUTO: 4.76 K/UL (ref 3.9–12.7)

## 2023-04-05 PROCEDURE — 85025 COMPLETE CBC W/AUTO DIFF WBC: CPT | Performed by: FAMILY MEDICINE

## 2023-04-05 PROCEDURE — 80061 LIPID PANEL: CPT | Performed by: FAMILY MEDICINE

## 2023-04-05 PROCEDURE — 36415 COLL VENOUS BLD VENIPUNCTURE: CPT | Mod: PN | Performed by: FAMILY MEDICINE

## 2023-04-05 PROCEDURE — 84443 ASSAY THYROID STIM HORMONE: CPT | Performed by: FAMILY MEDICINE

## 2023-04-05 PROCEDURE — 80053 COMPREHEN METABOLIC PANEL: CPT | Performed by: FAMILY MEDICINE

## 2023-04-05 PROCEDURE — 82306 VITAMIN D 25 HYDROXY: CPT | Performed by: FAMILY MEDICINE

## 2023-04-05 PROCEDURE — 83036 HEMOGLOBIN GLYCOSYLATED A1C: CPT | Performed by: FAMILY MEDICINE

## 2023-04-29 DIAGNOSIS — E03.4 HYPOTHYROIDISM DUE TO ACQUIRED ATROPHY OF THYROID: ICD-10-CM

## 2023-04-29 RX ORDER — LEVOTHYROXINE SODIUM 112 UG/1
TABLET ORAL
Qty: 90 TABLET | Refills: 2 | Status: SHIPPED | OUTPATIENT
Start: 2023-04-29 | End: 2024-02-08

## 2024-02-08 DIAGNOSIS — E03.4 HYPOTHYROIDISM DUE TO ACQUIRED ATROPHY OF THYROID: ICD-10-CM

## 2024-02-08 RX ORDER — LEVOTHYROXINE SODIUM 112 UG/1
TABLET ORAL
Qty: 90 TABLET | Refills: 0 | Status: SHIPPED | OUTPATIENT
Start: 2024-02-08 | End: 2024-05-14

## 2024-06-04 ENCOUNTER — LAB VISIT (OUTPATIENT)
Dept: LAB | Facility: HOSPITAL | Age: 44
End: 2024-06-04
Attending: FAMILY MEDICINE
Payer: COMMERCIAL

## 2024-06-04 DIAGNOSIS — E03.4 HYPOTHYROIDISM DUE TO ACQUIRED ATROPHY OF THYROID: ICD-10-CM

## 2024-06-04 DIAGNOSIS — Z00.00 ROUTINE GENERAL MEDICAL EXAMINATION AT HEALTH CARE FACILITY: ICD-10-CM

## 2024-06-04 DIAGNOSIS — E55.9 VITAMIN D DEFICIENCY: ICD-10-CM

## 2024-06-04 LAB
25(OH)D3+25(OH)D2 SERPL-MCNC: 25 NG/ML (ref 30–96)
ALBUMIN SERPL BCP-MCNC: 3.6 G/DL (ref 3.5–5.2)
ALP SERPL-CCNC: 49 U/L (ref 55–135)
ALT SERPL W/O P-5'-P-CCNC: 12 U/L (ref 10–44)
ANION GAP SERPL CALC-SCNC: 7 MMOL/L (ref 8–16)
AST SERPL-CCNC: 17 U/L (ref 10–40)
BASOPHILS # BLD AUTO: 0.08 K/UL (ref 0–0.2)
BASOPHILS NFR BLD: 1.6 % (ref 0–1.9)
BILIRUB SERPL-MCNC: 0.3 MG/DL (ref 0.1–1)
BUN SERPL-MCNC: 18 MG/DL (ref 6–20)
CALCIUM SERPL-MCNC: 9 MG/DL (ref 8.7–10.5)
CHLORIDE SERPL-SCNC: 108 MMOL/L (ref 95–110)
CHOLEST SERPL-MCNC: 162 MG/DL (ref 120–199)
CHOLEST/HDLC SERPL: 2.9 {RATIO} (ref 2–5)
CO2 SERPL-SCNC: 23 MMOL/L (ref 23–29)
CREAT SERPL-MCNC: 0.8 MG/DL (ref 0.5–1.4)
DIFFERENTIAL METHOD BLD: ABNORMAL
EOSINOPHIL # BLD AUTO: 0.2 K/UL (ref 0–0.5)
EOSINOPHIL NFR BLD: 4.1 % (ref 0–8)
ERYTHROCYTE [DISTWIDTH] IN BLOOD BY AUTOMATED COUNT: 18.6 % (ref 11.5–14.5)
EST. GFR  (NO RACE VARIABLE): >60 ML/MIN/1.73 M^2
GLUCOSE SERPL-MCNC: 83 MG/DL (ref 70–110)
HCT VFR BLD AUTO: 32.5 % (ref 37–48.5)
HDLC SERPL-MCNC: 56 MG/DL (ref 40–75)
HDLC SERPL: 34.6 % (ref 20–50)
HGB BLD-MCNC: 9.3 G/DL (ref 12–16)
IMM GRANULOCYTES # BLD AUTO: 0.01 K/UL (ref 0–0.04)
IMM GRANULOCYTES NFR BLD AUTO: 0.2 % (ref 0–0.5)
LDLC SERPL CALC-MCNC: 93.6 MG/DL (ref 63–159)
LYMPHOCYTES # BLD AUTO: 1.4 K/UL (ref 1–4.8)
LYMPHOCYTES NFR BLD: 26.3 % (ref 18–48)
MCH RBC QN AUTO: 21.8 PG (ref 27–31)
MCHC RBC AUTO-ENTMCNC: 28.6 G/DL (ref 32–36)
MCV RBC AUTO: 76 FL (ref 82–98)
MONOCYTES # BLD AUTO: 0.6 K/UL (ref 0.3–1)
MONOCYTES NFR BLD: 10.7 % (ref 4–15)
NEUTROPHILS # BLD AUTO: 2.9 K/UL (ref 1.8–7.7)
NEUTROPHILS NFR BLD: 57.1 % (ref 38–73)
NONHDLC SERPL-MCNC: 106 MG/DL
NRBC BLD-RTO: 0 /100 WBC
PLATELET # BLD AUTO: 382 K/UL (ref 150–450)
PMV BLD AUTO: 10.8 FL (ref 9.2–12.9)
POTASSIUM SERPL-SCNC: 4.4 MMOL/L (ref 3.5–5.1)
PROT SERPL-MCNC: 6.5 G/DL (ref 6–8.4)
RBC # BLD AUTO: 4.27 M/UL (ref 4–5.4)
SODIUM SERPL-SCNC: 138 MMOL/L (ref 136–145)
TRIGL SERPL-MCNC: 62 MG/DL (ref 30–150)
TSH SERPL DL<=0.005 MIU/L-ACNC: 2.83 UIU/ML (ref 0.4–4)
WBC # BLD AUTO: 5.13 K/UL (ref 3.9–12.7)

## 2024-06-04 PROCEDURE — 82306 VITAMIN D 25 HYDROXY: CPT | Performed by: FAMILY MEDICINE

## 2024-06-04 PROCEDURE — 80053 COMPREHEN METABOLIC PANEL: CPT | Performed by: FAMILY MEDICINE

## 2024-06-04 PROCEDURE — 84443 ASSAY THYROID STIM HORMONE: CPT | Performed by: FAMILY MEDICINE

## 2024-06-04 PROCEDURE — 80061 LIPID PANEL: CPT | Performed by: FAMILY MEDICINE

## 2024-06-04 PROCEDURE — 85025 COMPLETE CBC W/AUTO DIFF WBC: CPT | Performed by: FAMILY MEDICINE

## 2024-06-04 PROCEDURE — 36415 COLL VENOUS BLD VENIPUNCTURE: CPT | Mod: PN | Performed by: FAMILY MEDICINE

## 2024-06-13 DIAGNOSIS — E03.4 HYPOTHYROIDISM DUE TO ACQUIRED ATROPHY OF THYROID: ICD-10-CM

## 2024-06-13 RX ORDER — LEVOTHYROXINE SODIUM 112 UG/1
TABLET ORAL
Qty: 90 TABLET | Refills: 3 | Status: SHIPPED | OUTPATIENT
Start: 2024-06-13

## 2024-07-21 ENCOUNTER — PATIENT MESSAGE (OUTPATIENT)
Dept: OTHER | Facility: OTHER | Age: 44
End: 2024-07-21
Payer: COMMERCIAL

## 2024-07-24 ENCOUNTER — LAB VISIT (OUTPATIENT)
Dept: LAB | Facility: HOSPITAL | Age: 44
End: 2024-07-24
Attending: FAMILY MEDICINE
Payer: COMMERCIAL

## 2024-07-24 DIAGNOSIS — E61.1 IRON DEFICIENCY: ICD-10-CM

## 2024-07-24 LAB
FERRITIN SERPL-MCNC: 5 NG/ML (ref 20–300)
IRON SERPL-MCNC: 32 UG/DL (ref 30–160)
SATURATED IRON: 6 % (ref 20–50)
TOTAL IRON BINDING CAPACITY: 502 UG/DL (ref 250–450)
TRANSFERRIN SERPL-MCNC: 339 MG/DL (ref 200–375)

## 2024-07-24 PROCEDURE — 83540 ASSAY OF IRON: CPT | Performed by: FAMILY MEDICINE

## 2024-07-24 PROCEDURE — 36415 COLL VENOUS BLD VENIPUNCTURE: CPT | Mod: PN | Performed by: FAMILY MEDICINE

## 2024-07-24 PROCEDURE — 82728 ASSAY OF FERRITIN: CPT | Performed by: FAMILY MEDICINE

## 2024-07-24 PROCEDURE — 84466 ASSAY OF TRANSFERRIN: CPT | Performed by: FAMILY MEDICINE

## 2024-07-26 ENCOUNTER — LAB VISIT (OUTPATIENT)
Dept: LAB | Facility: HOSPITAL | Age: 44
End: 2024-07-26
Attending: FAMILY MEDICINE
Payer: COMMERCIAL

## 2024-07-26 DIAGNOSIS — R39.9 UTI SYMPTOMS: ICD-10-CM

## 2024-07-26 LAB
BILIRUB UR QL STRIP: NEGATIVE
CLARITY UR REFRACT.AUTO: CLEAR
COLOR UR AUTO: COLORLESS
GLUCOSE UR QL STRIP: NEGATIVE
HGB UR QL STRIP: NEGATIVE
KETONES UR QL STRIP: NEGATIVE
LEUKOCYTE ESTERASE UR QL STRIP: NEGATIVE
NITRITE UR QL STRIP: NEGATIVE
PH UR STRIP: 7 [PH] (ref 5–8)
PROT UR QL STRIP: NEGATIVE
SP GR UR STRIP: 1.01 (ref 1–1.03)
URN SPEC COLLECT METH UR: ABNORMAL

## 2024-07-26 PROCEDURE — 81003 URINALYSIS AUTO W/O SCOPE: CPT | Performed by: FAMILY MEDICINE

## 2024-07-26 PROCEDURE — 87086 URINE CULTURE/COLONY COUNT: CPT | Performed by: FAMILY MEDICINE

## 2024-07-27 LAB — BACTERIA UR CULT: NORMAL

## 2024-10-17 PROBLEM — N92.0 EXCESSIVE OR FREQUENT MENSTRUATION: Status: ACTIVE | Noted: 2024-10-17

## 2025-06-24 PROBLEM — E66.01 CLASS 2 SEVERE OBESITY DUE TO EXCESS CALORIES WITH SERIOUS COMORBIDITY AND BODY MASS INDEX (BMI) OF 35.0 TO 35.9 IN ADULT: Status: ACTIVE | Noted: 2025-06-24

## 2025-06-24 PROBLEM — E66.812 CLASS 2 SEVERE OBESITY DUE TO EXCESS CALORIES WITH SERIOUS COMORBIDITY AND BODY MASS INDEX (BMI) OF 35.0 TO 35.9 IN ADULT: Status: ACTIVE | Noted: 2025-06-24

## 2025-07-11 DIAGNOSIS — E03.4 HYPOTHYROIDISM DUE TO ACQUIRED ATROPHY OF THYROID: ICD-10-CM

## 2025-07-11 RX ORDER — LEVOTHYROXINE SODIUM 112 UG/1
TABLET ORAL
Qty: 90 TABLET | Refills: 3 | Status: SHIPPED | OUTPATIENT
Start: 2025-07-11